# Patient Record
Sex: FEMALE | Race: BLACK OR AFRICAN AMERICAN | NOT HISPANIC OR LATINO | Employment: FULL TIME | URBAN - METROPOLITAN AREA
[De-identification: names, ages, dates, MRNs, and addresses within clinical notes are randomized per-mention and may not be internally consistent; named-entity substitution may affect disease eponyms.]

---

## 2022-06-29 ENCOUNTER — PRENATAL OFFICE VISIT (OUTPATIENT)
Dept: OBGYN | Facility: CLINIC | Age: 42
End: 2022-06-29
Payer: MEDICAID

## 2022-06-29 VITALS
BODY MASS INDEX: 35.7 KG/M2 | DIASTOLIC BLOOD PRESSURE: 73 MMHG | WEIGHT: 194 LBS | HEART RATE: 109 BPM | SYSTOLIC BLOOD PRESSURE: 115 MMHG | HEIGHT: 62 IN | OXYGEN SATURATION: 99 %

## 2022-06-29 DIAGNOSIS — O09.529 ANTEPARTUM MULTIGRAVIDA OF ADVANCED MATERNAL AGE: Primary | ICD-10-CM

## 2022-06-29 DIAGNOSIS — R89.9 ABNORMAL LABORATORY TEST: ICD-10-CM

## 2022-06-29 LAB
ABO/RH(D): NORMAL
ANTIBODY SCREEN: NEGATIVE
BASOPHILS # BLD AUTO: 0 10E3/UL (ref 0–0.2)
BASOPHILS NFR BLD AUTO: 1 %
EOSINOPHIL # BLD AUTO: 0.1 10E3/UL (ref 0–0.7)
EOSINOPHIL NFR BLD AUTO: 1 %
ERYTHROCYTE [DISTWIDTH] IN BLOOD BY AUTOMATED COUNT: 15.4 % (ref 10–15)
HBA1C MFR BLD: 6.4 % (ref 0–5.6)
HBV SURFACE AG SERPL QL IA: NONREACTIVE
HCT VFR BLD AUTO: 33 % (ref 35–47)
HCV AB SERPL QL IA: NONREACTIVE
HGB BLD-MCNC: 10.7 G/DL (ref 11.7–15.7)
HIV 1+2 AB+HIV1 P24 AG SERPL QL IA: NONREACTIVE
IMM GRANULOCYTES # BLD: 0 10E3/UL
IMM GRANULOCYTES NFR BLD: 1 %
LYMPHOCYTES # BLD AUTO: 1.3 10E3/UL (ref 0.8–5.3)
LYMPHOCYTES NFR BLD AUTO: 22 %
MCH RBC QN AUTO: 22.2 PG (ref 26.5–33)
MCHC RBC AUTO-ENTMCNC: 32.4 G/DL (ref 31.5–36.5)
MCV RBC AUTO: 69 FL (ref 78–100)
MONOCYTES # BLD AUTO: 0.4 10E3/UL (ref 0–1.3)
MONOCYTES NFR BLD AUTO: 8 %
NEUTROPHILS # BLD AUTO: 4 10E3/UL (ref 1.6–8.3)
NEUTROPHILS NFR BLD AUTO: 69 %
PLATELET # BLD AUTO: 267 10E3/UL (ref 150–450)
RBC # BLD AUTO: 4.82 10E6/UL (ref 3.8–5.2)
RUBV IGG SERPL QL IA: 14.8 INDEX
RUBV IGG SERPL QL IA: POSITIVE
SPECIMEN EXPIRATION DATE: NORMAL
T PALLIDUM AB SER QL: NONREACTIVE
WBC # BLD AUTO: 5.8 10E3/UL (ref 4–11)

## 2022-06-29 PROCEDURE — 87624 HPV HI-RISK TYP POOLED RSLT: CPT | Performed by: NURSE PRACTITIONER

## 2022-06-29 PROCEDURE — 86780 TREPONEMA PALLIDUM: CPT | Performed by: NURSE PRACTITIONER

## 2022-06-29 PROCEDURE — 86850 RBC ANTIBODY SCREEN: CPT | Performed by: NURSE PRACTITIONER

## 2022-06-29 PROCEDURE — 87591 N.GONORRHOEAE DNA AMP PROB: CPT | Performed by: NURSE PRACTITIONER

## 2022-06-29 PROCEDURE — 87086 URINE CULTURE/COLONY COUNT: CPT | Performed by: NURSE PRACTITIONER

## 2022-06-29 PROCEDURE — 87340 HEPATITIS B SURFACE AG IA: CPT | Performed by: NURSE PRACTITIONER

## 2022-06-29 PROCEDURE — 86803 HEPATITIS C AB TEST: CPT | Performed by: NURSE PRACTITIONER

## 2022-06-29 PROCEDURE — 85025 COMPLETE CBC W/AUTO DIFF WBC: CPT | Performed by: NURSE PRACTITIONER

## 2022-06-29 PROCEDURE — 86901 BLOOD TYPING SEROLOGIC RH(D): CPT | Performed by: NURSE PRACTITIONER

## 2022-06-29 PROCEDURE — G0145 SCR C/V CYTO,THINLAYER,RESCR: HCPCS | Performed by: NURSE PRACTITIONER

## 2022-06-29 PROCEDURE — 86900 BLOOD TYPING SEROLOGIC ABO: CPT | Performed by: NURSE PRACTITIONER

## 2022-06-29 PROCEDURE — 83036 HEMOGLOBIN GLYCOSYLATED A1C: CPT | Performed by: NURSE PRACTITIONER

## 2022-06-29 PROCEDURE — 87389 HIV-1 AG W/HIV-1&-2 AB AG IA: CPT | Performed by: NURSE PRACTITIONER

## 2022-06-29 PROCEDURE — 36415 COLL VENOUS BLD VENIPUNCTURE: CPT | Performed by: NURSE PRACTITIONER

## 2022-06-29 PROCEDURE — 99207 PR FIRST OB VISIT: CPT | Performed by: NURSE PRACTITIONER

## 2022-06-29 PROCEDURE — 87491 CHLMYD TRACH DNA AMP PROBE: CPT | Performed by: NURSE PRACTITIONER

## 2022-06-29 PROCEDURE — 86762 RUBELLA ANTIBODY: CPT | Performed by: NURSE PRACTITIONER

## 2022-06-29 ASSESSMENT — PAIN SCALES - GENERAL: PAINLEVEL: NO PAIN (0)

## 2022-06-29 NOTE — PATIENT INSTRUCTIONS
If you have any questions regarding your visit, Please contact your care team.     [facility]: [phone]  To Schedule an Appointment 24/7  Call: [phone] TELEPHONE [facility]BER     Jessica Hahn- APRN CNP      Rocky Mcneill-TRACIE Strickland-RN  Salud Gillespie-Surgery Scheduler  Mariel-Surgery Scheduler         Monday 7:30 am-5:00 pm    Tuesday 8:00 am-4:00 pm    Wednesday 7:30 am-4:00 pm  [place]    Thursday 8:00 am-11:00 am    Friday 7:30 am-4:00 pm [address] [place] [place] [address] [phone] ask for [facility]  [phone] Fax    Imaging Scheduling all locations  [phone]     [facility] Labor and Delivery  [address] [place] [address]   [place], [place] [id number] [phone]         Urgent Care locations:  [facility]   Monday-Friday  10 am - 8 pm  Saturday and Sunday   9 am - 5 pm     [phone]   If you need a medication refill, please contact your pharmacy. Please allow 3 business days for your refill to be completed.  As always, Thank you for trusting us with your healthcare needs!      see additional instructions from your care team below

## 2022-06-29 NOTE — PROGRESS NOTES
"Steve is a 41 year old  @ 12 weeks here for new OB visit.      See OB questionnaire for pertinent components of HPI.    OBhx:  x 3-per patient, no pregnancy or delivery complications  SAB x 1  Gyne: Pap smears Normal  Past Medical History:   Diagnosis Date     No pertinent past medical history      Past Surgical History:   Procedure Laterality Date     NO HISTORY OF SURGERY       There are no problems to display for this patient.     No Known Allergies  Current Outpatient Medications   Medication Sig Dispense Refill     Prenatal Vit-Fe Fumarate-FA (PRENATAL VITAMIN PO)          Review of Systems:     CONSTITUTIONAL:NEGATIVE for fever, chills, change in weight  INTEGUMENTARY/SKIN: NEGATIVE for worrisome rashes, moles or lesions  EYES: NEGATIVE for vision changes or irritation  ENT/MOUTH: NEGATIVE for ear, mouth and throat problems  RESP:NEGATIVE for significant cough or SOB  BREAST: NEGATIVE for masses, tenderness or discharge  CV: NEGATIVE for chest pain, palpitations or peripheral edema  GI: NEGATIVE for nausea, abdominal pain, heartburn, or change in bowel habits  :  Denies current vaginal bleeding, abnormal vaginal discharge  NEURO: NEGATIVE for weakness, dizziness or paresthesias  HEME/ALLERGY/IMMUNE: NEGATIVE for bleeding problems  PSYCHIATRIC: NEGATIVE for changes in mood or affect      Past Medical History of Father of Baby: No significant medical history  History Since Last Menstrual Period: No Problems    Physical Exam: /73 (BP Location: Right arm, Patient Position: Sitting, Cuff Size: Adult Regular)   Pulse 109   Ht 1.575 m (5' 2\")   Wt 88 kg (194 lb)   LMP 2022   SpO2 99%   BMI 35.48 kg/m    General: Well developed, well nourished female  Skin: Normal  Neck: Supple,no adenopathy,thyroid normal  Chest: Clear to auscultation  Heart: Regular rate, rhythm,No murmur, rub, gallop  Abdomen: Benign and No masses, organomegaly    Extremities: Normal  Neurological: Normal   Perineum: " Intact   Vulva: Normal  Vagina: Normal mucosa, no discharge  Cervix: Parous, closed    A/P 41 year old  at 12 weeks  Discussed physician coverage, tertiary support, diet, exercise, weight gain, schedule of visits, routine and indicated ultrasounds, and childbirth education.  Prenatal labs: Prenatal Panel, GC, Chlamydia, Urine Culture, Pap smear, A1C  Continue taking prenatal vitamins  Discussed genetic screening with MQS, CfDNA, first trimester screen-patient declines 1st trimester screening/genetic testing, but would be amenable to level 2 ultrasound with [facility].     Discussed aspirin use in pregnancy.  Low-dose aspirin prophylaxis can be beneficial in women at high risk of developing preeclampsia.  I generally recommend we begin aspirin at about 12-13 weeks gestation and continue until at least 36 weeks.     Women with at least one of the following conditions are considered high risk for developing preeclampsia: Previous pregnancy with preeclampsia,  multifetal-gestation, chronic hypertension, diabetes mellitus, chronic kidney disease, autoimmune disease.     Women with more than 1 of the following conditions may also consider low-dose aspirin prophylaxis in pregnancy: Nulliparity, BMI greater than 30, family history of preeclampsia (mother or sister), AMA, socio-demographic characteristics, personal risk factors.       Patient does meet the above criteria.  Discussed risks and benefits of low dose Asprin therapy and she is undecided, does not think she would desire to take this despite recommendations after discussion of benefits vs risks.     Jessica SHERWOOD CNP

## 2022-06-30 LAB
C TRACH DNA SPEC QL NAA+PROBE: NEGATIVE
N GONORRHOEA DNA SPEC QL NAA+PROBE: NEGATIVE

## 2022-07-01 PROBLEM — O99.019 ANTEPARTUM ANEMIA: Status: ACTIVE | Noted: 2022-07-01

## 2022-07-01 LAB
BACTERIA UR CULT: NO GROWTH
BKR LAB AP GYN ADEQUACY: NORMAL
BKR LAB AP GYN INTERPRETATION: NORMAL
BKR LAB AP HPV REFLEX: NORMAL
BKR LAB AP LMP: NORMAL
BKR LAB AP PREVIOUS ABNORMAL: NORMAL
PATH REPORT.COMMENTS IMP SPEC: NORMAL
PATH REPORT.COMMENTS IMP SPEC: NORMAL
PATH REPORT.RELEVANT HX SPEC: NORMAL

## 2022-07-05 ENCOUNTER — TELEPHONE (OUTPATIENT)
Dept: OBGYN | Facility: CLINIC | Age: 42
End: 2022-07-05

## 2022-07-05 LAB
HUMAN PAPILLOMA VIRUS 16 DNA: NEGATIVE
HUMAN PAPILLOMA VIRUS 18 DNA: NEGATIVE
HUMAN PAPILLOMA VIRUS FINAL DIAGNOSIS: NORMAL
HUMAN PAPILLOMA VIRUS OTHER HR: NEGATIVE

## 2022-07-05 NOTE — TELEPHONE ENCOUNTER
[facility] Call Center    Phone Message    May a detailed message be left on voicemail: yes     Reason for Call: Requesting Results   Name/type of test: Labs  Date of test: 7/01/2022  Was test done at a location other than [facility]       Action Taken: Message routed to:  [facility] p [phone]    Travel Screening: Not Applicable

## 2022-07-05 NOTE — TELEPHONE ENCOUNTER
See result note encounter, RN closing this encounter.    Charito Castellano RN on 7/5/2022 at 8:39 AM

## 2022-07-12 ENCOUNTER — ANCILLARY PROCEDURE (OUTPATIENT)
Dept: ULTRASOUND IMAGING | Facility: CLINIC | Age: 42
End: 2022-07-12
Attending: NURSE PRACTITIONER
Payer: MEDICAID

## 2022-07-12 DIAGNOSIS — O09.529 ANTEPARTUM MULTIGRAVIDA OF ADVANCED MATERNAL AGE: ICD-10-CM

## 2022-07-12 PROCEDURE — 76805 OB US >/= 14 WKS SNGL FETUS: CPT | Performed by: RADIOLOGY

## 2022-07-18 ENCOUNTER — LAB (OUTPATIENT)
Dept: LAB | Facility: CLINIC | Age: 42
End: 2022-07-18
Payer: MEDICAID

## 2022-07-18 DIAGNOSIS — R89.9 ABNORMAL LABORATORY TEST: ICD-10-CM

## 2022-07-18 LAB — GLUCOSE 1H P 50 G GLC PO SERPL-MCNC: 135 MG/DL (ref 70–129)

## 2022-07-18 PROCEDURE — 82950 GLUCOSE TEST: CPT

## 2022-07-18 PROCEDURE — 36415 COLL VENOUS BLD VENIPUNCTURE: CPT

## 2022-07-19 DIAGNOSIS — O09.522 MULTIGRAVIDA OF ADVANCED MATERNAL AGE IN SECOND TRIMESTER: Primary | ICD-10-CM

## 2022-07-21 ENCOUNTER — LAB (OUTPATIENT)
Dept: LAB | Facility: CLINIC | Age: 42
End: 2022-07-21
Payer: MEDICAID

## 2022-07-21 DIAGNOSIS — O09.522 MULTIGRAVIDA OF ADVANCED MATERNAL AGE IN SECOND TRIMESTER: ICD-10-CM

## 2022-07-21 LAB
GESTATIONAL GTT 1 HR POST DOSE: 185 MG/DL (ref 60–179)
GESTATIONAL GTT 2 HR POST DOSE: 165 MG/DL (ref 60–154)
GESTATIONAL GTT 3 HR POST DOSE: 74 MG/DL (ref 60–139)
GLUCOSE P FAST SERPL-MCNC: 101 MG/DL (ref 60–94)

## 2022-07-21 PROCEDURE — 36415 COLL VENOUS BLD VENIPUNCTURE: CPT

## 2022-07-21 PROCEDURE — 82951 GLUCOSE TOLERANCE TEST (GTT): CPT

## 2022-07-21 PROCEDURE — 82952 GTT-ADDED SAMPLES: CPT

## 2022-07-24 DIAGNOSIS — O24.410 DIET CONTROLLED GESTATIONAL DIABETES MELLITUS (GDM) IN SECOND TRIMESTER: ICD-10-CM

## 2022-07-24 DIAGNOSIS — O09.522 MULTIGRAVIDA OF ADVANCED MATERNAL AGE IN SECOND TRIMESTER: ICD-10-CM

## 2022-07-27 ENCOUNTER — PRENATAL OFFICE VISIT (OUTPATIENT)
Dept: OBGYN | Facility: CLINIC | Age: 42
End: 2022-07-27
Payer: MEDICAID

## 2022-07-27 VITALS
SYSTOLIC BLOOD PRESSURE: 101 MMHG | BODY MASS INDEX: 36.98 KG/M2 | DIASTOLIC BLOOD PRESSURE: 67 MMHG | HEART RATE: 93 BPM | WEIGHT: 202.19 LBS

## 2022-07-27 DIAGNOSIS — O24.410 DIET CONTROLLED GESTATIONAL DIABETES MELLITUS (GDM) IN SECOND TRIMESTER: ICD-10-CM

## 2022-07-27 DIAGNOSIS — O09.522 MULTIGRAVIDA OF ADVANCED MATERNAL AGE IN SECOND TRIMESTER: Primary | ICD-10-CM

## 2022-07-27 PROCEDURE — 99207 PR PRENATAL VISIT: CPT | Performed by: ADVANCED PRACTICE MIDWIFE

## 2022-07-27 RX ORDER — ASPIRIN 81 MG/1
81 TABLET, CHEWABLE ORAL DAILY
Qty: 90 TABLET | Refills: 2 | Status: SHIPPED | OUTPATIENT
Start: 2022-07-27 | End: 2022-10-25

## 2022-07-27 RX ORDER — VITAMIN A ACETATE, .BETA.-CAROTENE, ASCORBIC ACID, CHOLECALCIFEROL, .ALPHA.-TOCOPHEROL ACETATE, DL-, THIAMINE MONONITRATE, RIBOFLAVIN, NIACINAMIDE, PYRIDOXINE HYDROCHLORIDE, FOLIC ACID, CYANOCOBALAMIN, CALCIUM CARBONATE, FERROUS FUMARATE, ZINC OXIDE, AND CUPRIC OXIDE 2000; 2000; 120; 400; 22; 1.84; 3; 20; 10; 1; 12; 200; 27; 25; 2 [IU]/1; [IU]/1; MG/1; [IU]/1; MG/1; MG/1; MG/1; MG/1; MG/1; MG/1; UG/1; MG/1; MG/1; MG/1; MG/1
1 TABLET ORAL DAILY
Qty: 100 TABLET | Refills: 3 | Status: SHIPPED | OUTPATIENT
Start: 2022-07-27

## 2022-07-27 NOTE — PROGRESS NOTES
Complains of right shoulder/arn pain.  Recommend she see PCP.   Has what sounds like round ligament pain with turning in bed.   Discussed relief measures.   Declines quad.   MFM referral placed for AMA for comprehensive ultrasound.   Discussed weight.   She would prefer to see the same provider.   RTC 4 weeks.   KAELA Haddad, CNM

## 2022-07-29 ENCOUNTER — VIRTUAL VISIT (OUTPATIENT)
Dept: EDUCATION SERVICES | Facility: CLINIC | Age: 42
End: 2022-07-29
Payer: MEDICAID

## 2022-07-29 DIAGNOSIS — O24.410 DIET CONTROLLED GESTATIONAL DIABETES MELLITUS (GDM) IN SECOND TRIMESTER: Primary | ICD-10-CM

## 2022-07-29 PROCEDURE — G0108 DIAB MANAGE TRN  PER INDIV: HCPCS | Performed by: DIETITIAN, REGISTERED

## 2022-07-29 RX ORDER — LANCETS
EACH MISCELLANEOUS
Qty: 100 EACH | Refills: 4 | Status: SHIPPED | OUTPATIENT
Start: 2022-07-29 | End: 2023-01-09

## 2022-07-29 RX ORDER — BLOOD SUGAR DIAGNOSTIC
STRIP MISCELLANEOUS
Qty: 200 STRIP | Refills: 4 | Status: SHIPPED | OUTPATIENT
Start: 2022-07-29

## 2022-07-29 RX ORDER — BLOOD-GLUCOSE METER
1 EACH MISCELLANEOUS PRN
Qty: 1 KIT | Refills: 0 | Status: SHIPPED | OUTPATIENT
Start: 2022-07-29 | End: 2023-02-21

## 2022-07-29 NOTE — LETTER
7/29/2022         RE: Steve Pena  [address] [place] [address]        Dear Colleague,    Thank you for referring your patient, Steve Pena, to the [facility]. Please see a copy of my visit note below.    Type of Service: Telephone Visit    Originating Location (Patient Location): Home  Distant Location (Provider Location): [facility]  Mode of Communication:  Telephone    Telephone Visit Start Time: 10:04  Telephone Visit End Time (telephone visit stop time): 10:49    How would patient like to obtain AVS? MyChart     Diabetes Self-Management Education & Support      SUBJECTIVE/OBJECTIVE:  Presents for education related to gestational diabetes.    Accompanied by: Self  Diabetes management related comments/concerns: wondering about diabetes  Gestational weeks: 16w6d  [facility]  Number of previous pregnancies: 3  Had any babies over 9 lbs: No  Previously had Gestational Diabetes: No  Hypertension : No  High Cholesterol: No  High Triglycerides: No  Do you use tobacco products?: No  Do you drink beer, wine or hard liquor?: No    Cultural Influences/Ethnic Background:  Not  or     Estimated Date of Delivery: Jan 7, 2023    1 hour OGTT  Lab Results   Component Value Date    GLU1 135 (H) 07/18/2022         3 hour OGTT    Fasting  Lab Results   Component Value Date    GTTGF 101 (H) 07/21/2022       1 hour  Lab Results   Component Value Date    GTTG1 185 (H) 07/21/2022       2 hour  Lab Results   Component Value Date    GTTG2 165 (H) 07/21/2022       3 hour  Lab Results   Component Value Date    GTTG3 74 07/21/2022       Lifestyle and Health Behaviors:  Pre-pregnancy weight (lbs): 185  Exercise:: Currently not exercising (has not been exercising)  Barrier to exercise: None  Meals include: Breakfast, Lunch, Dinner  Breakfast: 6-615 (wakes)-water- eats at 9am,tea in morning  or bread OR bread with eggs OR oatmeal +  powdered milk  Lunch: 1-2pm: rice soup + grains/beans OR greens + fish/goat/beef OR canned beans with fish + white rice  Dinner: Watermelon + yogurt OR cereal with milk + vegetables  Snacks: fruit  Other: nauseated with pregnancy-sucking carlos candy, juice.  Beverages: Tea, Water, Juice  Pre- vitamin?: Yes  Supplements?: Yes  List supplements currently taking: Iron    Healthy Coping:  Emotional response to diabetes: Ready to learn  Informal Support system:: Family  Stage of change: PREPARATION (Decided to change - considering how)    Current Management:  Taking medications for gestational diabetes?: No    ASSESSMENT:  Steve has a new GDM diagnosis and also had A1C checked last month and was 6.4%. She is concerned about diabetes, we discussed this including increased risk of developing type 2 DMwith GDM diagnosis and A1C but we can reduce risk of development with diet/exercise. Will discuss more next week. We did GDM ed, she did well with education and verbalized understanding of all topics covered today. 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 Patient advised to call if 3 blood glucose elevated before returns next week. Instructed on ketone checking and told to call if unable to get ketones negative.       Discussed carbohydrate sources and impact on blood glucose. Reviewed basics of healthy eating and incorporating a variety of foods into meal plan. Instructed on carbohydrate counting and label reading and recommended patient consume 30-45g cho for breakfast, 45-60g cho for lunch and dinner and 15-30g cho for each snacks, also adding protein at each of these. Suggested plate method to balance meals.      Discussed importance of not going too low in carbohydrates since that may cause liver to produce excess glucose and  "contribute to elevated blood glucose readings and ketone formation. Encouraged eating breakfast within 1 hour of waking.  To also help prevent against ketone formation, protein was encouraged with meals and snacks, especially with the night snack. Reviewed benefits of exercising to help lower blood glucose and walking after meals, as tolerated and per MD approval, if seeing elevated blood glucose after a meal. Pt verbalized understanding of concepts discussed and recommendations provided.      Educational topics covered today:  GDM diagnosis, pathophysiology, Risks and Complications of GDM, Means of controlling GDM, Using a Blood Glucose Monitor, Blood Glucose Goals, Logging and Interpreting Glucose Results, Ketone Testing, When to Call a Diabetes Educator or OB Provider, Healthy Eating During Pregnancy, Counting Carbohydrates, Meal Planning for GDM, and Physical Activity    Educational materials provided today: (emailed)  Wilson Understanding Gestational Diabetes  GDM Log Book  Sharps Disposal  Care After Delivery      Pt verbalized understanding of concepts discussed and recommendations provided today.     PLAN:  Your 1 week follow-up Diabetes Education visit is scheduled on 8/5    1. Check glucose 4 times daily, before breakfast daily and 1 hour after each meal, or as recommended.  Blood glucose goal before breakfast: <95 mg/dL  1 hour after start of meals:  <140 mg/dL OR  2 hours after start of meal: <120mg/dL (can do this if you forget 1 hour check)     -Lancets should be placed in a sharps container or you can use a laundry container, do not throw lancets in the trash.  If using laundry container, once mostly full, can duct-tape the lid closed, label \"Sharps-do not recycle\" and then place in trash. You can call your garbage service to find appropriate drop off sites for lancets.    2. Check ketones daily or once a week after they have been negative for 7 days in a row. If ketones are elevated, let your " diabetes educator know and continue to check daily until they are negative for 7 days in a row.    3. Continue with recommended physical activity.    4. Continue to follow recommended meal plan: 30-45g carbs at breakfast, 45-60g carbs at lunch, 45-60g carbs at supper, 15-30g carbs at snacks.  Follow consistent CHO meal plan, eat CHO and protein/fat at all meals/snacks.    5. Follow-up with OB doctor as recommended.    6. Call or MyChart message your diabetes educator if 3 or more blood sugars are above the goal in 1 week or if ketones are elevated (trace or above).     Ashley Smalls RD, MIGUEL, [facility]      Time Spent: 45 minutes  Encounter Type: Individual    Any diabetes medication dose changes were made via the CDE Protocol and Collaborative Practice Agreement with the patient's OB/GYN provider. A copy of this encounter was shared with the provider.

## 2022-07-29 NOTE — PROGRESS NOTES
Type of Service: Telephone Visit    Originating Location (Patient Location): Home  Distant Location (Provider Location): [facility]  Mode of Communication:  Telephone    Telephone Visit Start Time: 10:04  Telephone Visit End Time (telephone visit stop time): 10:49    How would patient like to obtain AVS? Loren     Diabetes Self-Management Education & Support      SUBJECTIVE/OBJECTIVE:  Presents for education related to gestational diabetes.    Accompanied by: Self  Diabetes management related comments/concerns: wondering about diabetes  Gestational weeks: 16w6d  [facility]ospital planned [facility] delivery: [facility]  Number of previous pregnancies: 3  Had any babies over 9 lbs: No  Previously had Gestational Diabetes: No  Hypertension : No  High Cholesterol: No  High Triglycerides: No  Do you use tobacco products?: No  Do you drink beer, wine or hard liquor?: No    Cultural Influences/Ethnic Background:  Not  or     Estimated Date of Delivery: Jan 7, 2023    1 hour OGTT  Lab Results   Component Value Date    GLU1 135 (H) 07/18/2022         3 hour OGTT    Fasting  Lab Results   Component Value Date    GTTGF 101 (H) 07/21/2022       1 hour  Lab Results   Component Value Date    GTTG1 185 (H) 07/21/2022       2 hour  Lab Results   Component Value Date    GTTG2 165 (H) 07/21/2022       3 hour  Lab Results   Component Value Date    GTTG3 74 07/21/2022       Lifestyle and Health Behaviors:  Pre-pregnancy weight (lbs): 185  Exercise:: Currently not exercising (has not been exercising)  Barrier to exercise: None  Meals include: Breakfast, Lunch, Dinner  Breakfast: 6-615 (wakes)-water- eats at 9am,tea in morning  or bread OR bread with eggs OR oatmeal + powdered milk  Lunch: 1-2pm: rice soup + grains/beans OR greens + fish/goat/beef OR canned beans with fish + white rice  Dinner: Watermelon + yogurt OR cereal with milk + vegetables  Snacks: fruit  Other: nauseated with pregnancy-sucking carlos  candy, juice.  Beverages: Tea, Water, Juice  Pre-rao vitamin?: Yes  Supplements?: Yes  List supplements currently taking: Iron    Healthy Coping:  Emotional response to diabetes: Ready to learn  Informal Support system:: Family  Stage of change: PREPARATION (Decided to change - considering how)    Current Management:  Taking medications for gestational diabetes?: No    ASSESSMENT:  Steve has a new GDM diagnosis and also had A1C checked last month and was 6.4%. She is concerned about diabetes, we discussed this including increased risk of developing type 2 DMwith GDM diagnosis and A1C but we can reduce risk of development with diet/exercise. Will discuss more next week. We did GDM ed, she did well with education and verbalized understanding of all topics covered today. 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 Patient advised to call if 3 blood glucose elevated before returns next week. Instructed on ketone checking and told to call if unable to get ketones negative.       Discussed carbohydrate sources and impact on blood glucose. Reviewed basics of healthy eating and incorporating a variety of foods into meal plan. Instructed on carbohydrate counting and label reading and recommended patient consume 30-45g cho for breakfast, 45-60g cho for lunch and dinner and 15-30g cho for each snacks, also adding protein at each of these. Suggested plate method to balance meals.      Discussed importance of not going too low in carbohydrates since that may cause liver to produce excess glucose and contribute to elevated blood glucose readings and ketone formation. Encouraged eating breakfast within 1 hour of waking.  To also help prevent against ketone formation, protein was encouraged with meals and snacks, especially with the night  "snack. Reviewed benefits of exercising to help lower blood glucose and walking after meals, as tolerated and per MD approval, if seeing elevated blood glucose after a meal. Pt verbalized understanding of concepts discussed and recommendations provided.      Educational topics covered today:  GDM diagnosis, pathophysiology, Risks and Complications of GDM, Means of controlling GDM, Using a Blood Glucose Monitor, Blood Glucose Goals, Logging and Interpreting Glucose Results, Ketone Testing, When to Call a Diabetes Educator or OB Provider, Healthy Eating During Pregnancy, Counting Carbohydrates, Meal Planning for GDM, and Physical Activity    Educational materials provided today: (emailed)  Wilson Understanding Gestational Diabetes  GDM Log Book  Sharps Disposal  Care After Delivery      Pt verbalized understanding of concepts discussed and recommendations provided today.     PLAN:  Your 1 week follow-up Diabetes Education visit is scheduled on 8/5    1. Check glucose 4 times daily, before breakfast daily and 1 hour after each meal, or as recommended.  Blood glucose goal before breakfast: <95 mg/dL  1 hour after start of meals:  <140 mg/dL OR  2 hours after start of meal: <120mg/dL (can do this if you forget 1 hour check)     -Lancets should be placed in a sharps container or you can use a laundry container, do not throw lancets in the trash.  If using laundry container, once mostly full, can duct-tape the lid closed, label \"Sharps-do not recycle\" and then place in trash. You can call your garbage service to find appropriate drop off sites for lancets.    2. Check ketones daily or once a week after they have been negative for 7 days in a row. If ketones are elevated, let your diabetes educator know and continue to check daily until they are negative for 7 days in a row.    3. Continue with recommended physical activity.    4. Continue to follow recommended meal plan: 30-45g carbs at breakfast, 45-60g carbs at lunch, " 45-60g carbs at supper, 15-30g carbs at snacks.  Follow consistent CHO meal plan, eat CHO and protein/fat at all meals/snacks.    5. Follow-up with OB doctor as recommended.    6. Call or MyChart message your diabetes educator if 3 or more blood sugars are above the goal in 1 week or if ketones are elevated (trace or above).     Ashley Smalls RD, MIGUEL, [facility]      Time Spent: 45 minutes  Encounter Type: Individual    Any diabetes medication dose changes were made via the CDE Protocol and Collaborative Practice Agreement with the patient's OB/GYN provider. A copy of this encounter was shared with the provider.

## 2022-07-29 NOTE — PATIENT INSTRUCTIONS
"Your 1 week follow-up Diabetes Education visit is scheduled on 8/5    1. Check glucose 4 times daily, before breakfast daily and 1 hour after each meal, or as recommended.  Blood glucose goal before breakfast: <95 mg/dL  1 hour after start of meals:  <140 mg/dL OR  2 hours after start of meal: <120mg/dL (can do this if you forget 1 hour check)     -Lancets should be placed in a sharps container or you can use a laundry container, do not throw lancets in the trash.  If using laundry container, once mostly full, can duct-tape the lid closed, label \"Sharps-do not recycle\" and then place in trash. You can call your [place] Education service to find appropriate drop off sites for lancets.    2. Check ketones daily or once a week after they have been negative for 7 days in a row. If ketones are elevated, let your diabetes educator know and continue to check daily until they are negative for 7 days in a row.    3. Continue with recommended physical activity.    4. Continue to follow recommended meal plan: 30-45g carbs at breakfast, 45-60g carbs at lunch, 45-60g carbs at supper, 15-30g carbs at snacks.  Follow consistent CHO meal plan, eat CHO and protein/fat at all meals/snacks.    5. Follow-up with OB doctor as recommended.    6. Call or MyChart message your diabetes educator if 3 or more blood sugars are above the goal in 1 week or if ketones are elevated (trace or above).       [place] [facility] for the [facility] [[email or web address]] Area:  For Your Diabetes Education or Nutrition Appointments Call:  [phone]   For Diabetes Education and Nutrition Related Questions:   Phone: [phone]  Send MyChart Message   If you need a medication refill please contact your pharmacy. Please allow 3 business days for your refills to be completed.     "

## 2022-08-01 ENCOUNTER — MYC MEDICAL ADVICE (OUTPATIENT)
Dept: EDUCATION SERVICES | Facility: CLINIC | Age: 42
End: 2022-08-01

## 2022-08-01 NOTE — CONFIDENTIAL NOTE
I called Steve. She reports that she started testing BG's on Saturday. Her fasting BG's have always been over 100mg/dL (like 106 and 114). She has a trace amount of ketones. She is usually not eating a bedtime snack, but once had some yogurt. We reviewed the importance of a bedtime snack and some options with carbs and protein. She has already tried some changes herself and noticed that today after lunch her BG was 138 when she didn't eat rice and had a bean stew instead.     Reviewed that she is doing a great job figuring out what works and what doesn't work for her blood sugars. I asked her to reach out if any questions before her appt on Friday with Ashley Kelly RN, [facility]

## 2022-08-05 ENCOUNTER — MYC MEDICAL ADVICE (OUTPATIENT)
Dept: OBGYN | Facility: CLINIC | Age: 42
End: 2022-08-05

## 2022-08-05 ENCOUNTER — TELEPHONE (OUTPATIENT)
Dept: EDUCATION SERVICES | Facility: CLINIC | Age: 42
End: 2022-08-05

## 2022-08-05 ENCOUNTER — VIRTUAL VISIT (OUTPATIENT)
Dept: EDUCATION SERVICES | Facility: CLINIC | Age: 42
End: 2022-08-05
Attending: ADVANCED PRACTICE MIDWIFE
Payer: COMMERCIAL

## 2022-08-05 DIAGNOSIS — O24.414 INSULIN CONTROLLED GESTATIONAL DIABETES MELLITUS (GDM) IN SECOND TRIMESTER: Primary | ICD-10-CM

## 2022-08-05 DIAGNOSIS — O24.410 DIET CONTROLLED GESTATIONAL DIABETES MELLITUS (GDM) IN SECOND TRIMESTER: Primary | ICD-10-CM

## 2022-08-05 PROCEDURE — G0108 DIAB MANAGE TRN  PER INDIV: HCPCS | Mod: AE | Performed by: DIETITIAN, REGISTERED

## 2022-08-05 NOTE — LETTER
8/5/2022         RE: Steve Pena  [address] [place] [address]        Dear Colleague,    Thank you for referring your patient, Steve Pena, to the [facility]. Please see a copy of my visit note below.    Type of Service: Telephone Visit    Originating Location (Patient Location): Home  Distant Location (Provider Location): Home  Mode of Communication:  Telephone    Telephone Visit Start Time: 8:15-later call because pt was driving when I first called  Telephone Visit End Time (telephone visit stop time): 9:00    How would patient like to obtain AVS? MyChart     Diabetes Self-Management Education & Support    SUBJECTIVE/OBJECTIVE:  Presents for education related to gestational diabetes.    Accompanied by: Self  Diabetes management related comments/concerns: wondering why BG are elevated  Gestational weeks: 17w6d  Number of previous pregnancies: 3  Had any babies over 9 lbs: No  Previously had Gestational Diabetes: No  Hypertension : No  High Cholesterol: No  High Triglycerides: No  Do you use tobacco products?: No  Do you drink beer, wine or hard liquor?: No    Cultural Influences/Ethnic Background:  Not  or       LMP 04/02/2022       Estimated Date of Delivery: Jan 7, 2023    Blood Glucose/Ketone Log:    Date Ketones Fasting Post Breakfast Post Lunch Post Supper   7/30 Negative 106 140 163 128   7/31 Negative  114 184 174 148   8/1 Negative 117 167 138 148   8/2 trace  121 180 152 124   8/3 Negative 119 153 130 131   8/4 Negative 126 176 174 170   8/5 Negative 119 156       Breakfast-bulgur wheat instead rice.  8/3 lunch/dinner- did bulgur wheat for lunch and dinner    Lifestyle and Health Behaviors:  Pre-pregnancy weight (lbs): 185  Exercise:: Currently not exercising (has not been exercising)  Barrier to exercise: None  Meals include: Breakfast, Lunch, Dinner  Breakfast: 6-615 (wakes)-water- eats at 9am,tea in morning  or bread OR bread with eggs OR oatmeal +  powdered milk  Lunch: 1-2pm: rice soup + grains/beans OR greens + fish/goat/beef OR canned beans with fish + bulgur or rice  Dinner: Watermelon + yogurt OR cereal with milk + vegetables  Snacks: fruit  Other: Stopped eating rice because BG were higher, started eating Bulgur  Beverages: Tea, Water  Pre-rao vitamin?: Yes  Supplements?: Yes  List supplements currently taking: Iron    Healthy Coping:  Emotional response to diabetes: Ready to learn  Informal Support system:: Family  Stage of change: ACTION (Actively working towards change)    Current Management:  Taking medications for gestational diabetes?: No  Difficulty affording diabetes medication?: No    ASSESSMENT:  Ketones: all negative except 1 trace  Fasting blood glucoses: 0% in target.  After breakfast: 0% in target.  After lunch: 50% in target.  After dinner: 50% in target.    Majority of Steve's BG are above goals. She has tried adjusting her diet by switching from rice to Bulgur, this has brought down numbers some but still above goal. She is following GDM diet cho/protein with meals. She tried a HS snack to bring down fasting BG and she reports numbers were higher. We discussed importance of getting adequate nutrition for her and baby. Discussed that she just needs some insulin to help bring down the numbers. Ordered Levemir to start.     INTERVENTION:  Educational topics covered today:  What to expect after delivery, Future testing for Type 2 diabetes (2 hour OGTT at 6 week post-partum check-up and annual fasting blood glucose level), Risk of GDM and planning ahead for future pregnancies, Recommended lifestyle interventions for reducing the risk of Type 2 Diabetes, When to Call a Diabetes Educator or OB Provider    Educational Materials provided today:  None    PLAN:  1. Check glucose 4 times daily, before breakfast daily and 1 hour after each meal, or as recommended.  Blood glucose goal before breakfast: <95 mg/dL  1 hour after start of meals:  <140  mg/dL OR  2 hours after start of meal: <120mg/dL (can do this if you forget 1 hour check)      2. Check ketones once a week     3. Continue with recommended physical activity.    4. Continue to follow recommended meal plan: 30-45g carbs at breakfast, 45-60g carbs at lunch, 45-60g carbs at supper, 15-30g carbs at snacks.  Follow consistent CHO meal plan, eat CHO and protein/fat at all meals/snacks.    5. Follow-up with OB doctor as recommended.    6. Start Levemir 18units, increase by 2u every 2 days if fasting blood sugars are > 95mg/dL      Time Spent: 45 minutes  Encounter Type: Individual    Any diabetes medication dose changes were made via the CDE Protocol and Collaborative Practice Agreement with the patient's OB/GYN provider. A copy of this encounter was shared with the provider.

## 2022-08-05 NOTE — PROGRESS NOTES
Type of Service: Telephone Visit    Originating Location (Patient Location): Home  Distant Location (Provider Location): Home  Mode of Communication:  Telephone    Telephone Visit Start Time: 8:15-later call because pt was driving when I first called  Telephone Visit End Time (telephone visit stop time): 9:00    How would patient like to obtain AVS? Loren     Diabetes Self-Management Education & Support    SUBJECTIVE/OBJECTIVE:  Presents for education related to gestational diabetes.    Accompanied by: Self  Diabetes management related comments/concerns: wondering why BG are elevated  Gestational weeks: 17w6d  Number of previous pregnancies: 3  Had any babies over 9 lbs: No  Previously had Gestational Diabetes: No  Hypertension : No  High Cholesterol: No  High Triglycerides: No  Do you use tobacco products?: No  Do you drink beer, wine or hard liquor?: No    Cultural Influences/Ethnic Background:  Not  or       LMP 04/02/2022       Estimated Date of Delivery: Jan 7, 2023    Blood Glucose/Ketone Log:    Date Ketones Fasting Post Breakfast Post Lunch Post Supper   7/30 Negative 106 140 163 128   7/31 Negative  114 184 174 148   8/1 Negative 117 167 138 148   8/2 trace  121 180 152 124   8/3 Negative 119 153 130 131   8/4 Negative 126 176 174 170   8/5 Negative 119 156       Breakfast-bulgur wheat instead rice.  8/3 lunch/dinner- did bulgur wheat for lunch and dinner    Lifestyle and Health Behaviors:  Pre-pregnancy weight (lbs): 185  Exercise:: Currently not exercising (has not been exercising)  Barrier to exercise: None  Meals include: Breakfast, Lunch, Dinner  Breakfast: 6-615 (wakes)-water- eats at 9am,tea in morning  or bread OR bread with eggs OR oatmeal + powdered milk  Lunch: 1-2pm: rice soup + grains/beans OR greens + fish/goat/beef OR canned beans with fish + bulgur or rice  Dinner: Watermelon + yogurt OR cereal with milk + vegetables  Snacks: fruit  Other: Stopped eating rice because BG were  higher, started eating Bulgur  Beverages: Tea, Water  Pre-rao vitamin?: Yes  Supplements?: Yes  List supplements currently taking: Iron    Healthy Coping:  Emotional response to diabetes: Ready to learn  Informal Support system:: Family  Stage of change: ACTION (Actively working towards change)    Current Management:  Taking medications for gestational diabetes?: No  Difficulty affording diabetes medication?: No    ASSESSMENT:  Ketones: all negative except 1 trace  Fasting blood glucoses: 0% in target.  After breakfast: 0% in target.  After lunch: 50% in target.  After dinner: 50% in target.    Majority of Steve's BG are above goals. She has tried adjusting her diet by switching from rice to Bulgur, this has brought down numbers some but still above goal. She is following GDM diet cho/protein with meals. She tried a HS snack to bring down fasting BG and she reports numbers were higher. We discussed importance of getting adequate nutrition for her and baby. Discussed that she just needs some insulin to help bring down the numbers. Ordered Levemir to start.     INTERVENTION:  Educational topics covered today:  What to expect after delivery, Future testing for Type 2 diabetes (2 hour OGTT at 6 week post-partum check-up and annual fasting blood glucose level), Risk of GDM and planning ahead for future pregnancies, Recommended lifestyle interventions for reducing the risk of Type 2 Diabetes, When to Call a Diabetes Educator or OB Provider    Educational Materials provided today:  None    PLAN:  1. Check glucose 4 times daily, before breakfast daily and 1 hour after each meal, or as recommended.  Blood glucose goal before breakfast: <95 mg/dL  1 hour after start of meals:  <140 mg/dL OR  2 hours after start of meal: <120mg/dL (can do this if you forget 1 hour check)      2. Check ketones once a week     3. Continue with recommended physical activity.    4. Continue to follow recommended meal plan: 30-45g carbs at  breakfast, 45-60g carbs at lunch, 45-60g carbs at supper, 15-30g carbs at snacks.  Follow consistent CHO meal plan, eat CHO and protein/fat at all meals/snacks.    5. Follow-up with OB doctor as recommended.    6. Start Levemir 18units, increase by 2u every 2 days if fasting blood sugars are > 95mg/dL      Time Spent: 45 minutes  Encounter Type: Individual    Any diabetes medication dose changes were made via the CDE Protocol and Collaborative Practice Agreement with the patient's OB/GYN provider. A copy of this encounter was shared with the provider.

## 2022-08-05 NOTE — TELEPHONE ENCOUNTER
Met with pt for second visit. Majority of BG elevated. Ordered Levemir to start given majority of post-meal BG also elevated.     Thanks!    Ashley Smalls RD, LD, [facility]    Addendum-order sent to wrong provider. Closing encounter and opening new one.

## 2022-08-05 NOTE — PATIENT INSTRUCTIONS
Plan to share your glucose and ketone information with diabetes education once a week, unless otherwise directed.  Call us to schedule a follow-up for next week [phone] or you can send us a [place] Message or call triage line (see below)    1. Check glucose 4 times daily, before breakfast daily and 1 hour after each meal, or as recommended.  Blood glucose goal before breakfast: <95 mg/dL  1 hour after start of meals:  <140 mg/dL OR  2 hours after start of meal: <120mg/dL (can do this if you forget 1 hour check)      2. Check ketones daily or once a week after they have been negative for 7 days in a row. If ketones are elevated, let your diabetes educator know and continue to check daily until they are negative for 7 days in a row.    3. Continue with recommended physical activity.    4. Continue to follow recommended meal plan: 30-45g carbs at breakfast, 45-60g carbs at lunch, 45-60g carbs at supper, 15-30g carbs at snacks.  Follow consistent CHO meal plan, eat CHO and protein/fat at all meals/snacks.    5. Follow-up with OB doctor as recommended.    6. Start Levemir 18units, increase by 2u every 2 days if fasting blood sugars are > 95mg/dL    After Delivery:    Check blood sugar 4 - 6 times per week to be sure that the numbers have gone back to normal after baby is born. Check blood sugar before breakfast or 2 hours after the start of a meal.     Blood sugar goals are different when you are not pregnant:    Before breakfast: Less than 100  2 hours after a meal: Less than 140    If you have elevated numbers, contact your OB/GYN or primary care provider.    2. Have a 2-hour glucose tolerance test done at your post-partum check-up.    3. Continue with healthy eating and physical activity to get back to your pre-pregnancy weight.     4.  Have your fasting blood sugar checked once a year.    5. Plan ahead for future pregnancies - eat healthy, keep active, work with your doctor to check for gestational diabetes  early on in the pregnancy and check blood sugars as recommended by your doctor.     [place] [facility] for the [facility]:  For Your Diabetes Education or Nutrition Appointments Call:  [phone]   For Diabetes Education and Nutrition Related Questions:   Phone: [phone]  Send [employer] Message   If you need a medication refill please contact your pharmacy. Please allow 3 business days for your refills to be completed.

## 2022-08-05 NOTE — TELEPHONE ENCOUNTER
Met with pt for second visit. Majority of BG elevated. Ordered Levemir to start given majority of post-meal BG also elevated.     Thanks!    Ashley Smalls RD, LD, [facility]

## 2022-08-05 NOTE — TELEPHONE ENCOUNTER
RN routing to covering provider pool to sign off on medication in Jonna's absence prior to the weekend.    Charito Castellano RN on 8/5/2022 at 3:32 PM

## 2022-08-08 DIAGNOSIS — O24.414 INSULIN CONTROLLED GESTATIONAL DIABETES MELLITUS (GDM) IN SECOND TRIMESTER: ICD-10-CM

## 2022-08-08 NOTE — TELEPHONE ENCOUNTER
Routed to OB/GYN    Call from mar  Pharm needs max number of units on the levemir that she can go up to, also needs BD pen needles    Pharm cued    Silvana Gary RN   [facility]

## 2022-08-08 NOTE — TELEPHONE ENCOUNTER
I updated the directions on the insulin Rx to match our levemir starting guidelines (8 units, not 18 units as was listed on first prescription) and include a max TDD. Pt is aware and reports that 8 units was discussed with her during the appt. I also sent in a prescription for pen needles.    Sariah Kelly RN, [facility]

## 2022-08-08 NOTE — CONFIDENTIAL NOTE
I reviewed chart notes in order to respond to patient. I noted that the visit instructions said to start levemir at 18 units (which would be 0.2 units/kg/dayt, the [facility] starting dose recommendation). Pt notes that 8 units was discussed as the starting dose. 8 units of levemir is the typical starting dose for [facility] clinics. It appears pt clinic was marked as [facility] in error, but then corrected.     Since pt is going to start levemir I will [facility]mir start [facility] for gestational diabetes as it is the smaller of the 2 doses. I updated pt and providers.    Sariah Kelly RN, [facility]

## 2022-08-15 NOTE — TELEPHONE ENCOUNTER
[facility] referral faxed on 7/27.  Pt has not heard from them yet to schedule.  Cierra RN, called [facility] on Friday, 8/8, to make sure they received the referral.  They told her that they would call pt on 8/8.  Pt wrote in again on Friday, 8/12, stating that they have still not called her.  She has called them and left messages and they are still not calling her.

## 2022-08-18 NOTE — TELEPHONE ENCOUNTER
Pt states she still has not heard from [facility] and she has been LVM as well.  I called  [facility] and spoke with Maida, she states they do have the pt's referral and will bring it to the schedulers now to call pt to schedule.    Em Soto RN

## 2022-08-19 ENCOUNTER — VIRTUAL VISIT (OUTPATIENT)
Dept: EDUCATION SERVICES | Facility: CLINIC | Age: 42
End: 2022-08-19
Payer: COMMERCIAL

## 2022-08-19 DIAGNOSIS — O24.414 INSULIN CONTROLLED GESTATIONAL DIABETES MELLITUS (GDM) IN SECOND TRIMESTER: Primary | ICD-10-CM

## 2022-08-19 PROCEDURE — G0108 DIAB MANAGE TRN  PER INDIV: HCPCS | Mod: AE

## 2022-08-19 NOTE — LETTER
8/19/2022         RE: Steve Pena  [address] [address]        Dear Colleague,    Thank you for referring your patient, Steve Pena, to the [facility] DIABETES EDUCATION [place]. Please see a copy of my visit note below.    Diabetes Self-Management Education & Support  Video visit: 40 minutes    SUBJECTIVE/OBJECTIVE:  Presents for education related to gestational diabetes.    Accompanied by: Self  Diabetes management related comments/concerns: high BG even with insulin- mostly in the morning and following breakfast  Gestational weeks: 19 weeks 6 days  [place] [facility]  Next OB Visit Date: 08/24/22  Number of previous pregnancies: 3  Had any babies over 9 lbs: No  Previously had Gestational Diabetes: No  Hypertension : No  High Cholesterol: No  High Triglycerides: No  Do you use tobacco products?: No  Do you drink beer, wine or hard liquor?: No    Cultural Influences/Ethnic Background:  Not  or       LMP 04/02/2022     Weight reported per pt: 205 lbs    Estimated Date of Delivery: Jan 7, 2023    Blood Glucose/Ketone Log:   Date  Ketones FBG 1 hours post Breakfast 1 hour post lunch    1 hours post dinner   8/17  115 171     8/16   119 164 144 157   8/15  129 172 124 160   8/14  116 194 128 170   8/13  126 168 136 125       Date  Ketones FBG 1 hours post Breakfast 1 hour post lunch    1 hours post dinner   8/14  124 171 139 148   8/13  115 152 157 137   8/12  117 174 158 129   8/11  124 184 101 137   8/10   124 174 136 119   8/9  117 154 130 145   8/8  125 184 147 146   8/7  129 177 140 141       Diabetes Medication(s)     Insulin       insulin detemir (LEVEMIR PEN) 100 UNIT/ML pen    Start with 8 units at bedtime, if fasting BG great than 95 x 2 days increase dose by 2 units every 2 days until fasting BG below 95. Max TDD 40 units.      Is up to 14 units at bedtime.    Lifestyle and Health Behaviors:  Pre-pregnancy weight (lbs): 185  Exercise:: Yes (has  not been exercising)  Days per week of moderate to strenuous exercise (like a brisk walk): 4  On average, minutes per day of exercise at this level: 30  How intense was your typical exercise? : Light (like stretching or slow walking)  Exercise Minutes per Week: 120  Barrier to exercise: None  Meals include: Breakfast, Lunch, Dinner  Breakfast: 6-615 (wakes)-instant oatmeal 13 grams CHO plain or 1/2 C oatmeal 37 grams CHO or Belvita 35 grams CHO OR 2 eggs or toast whole grain  Lunch: 1-2pm: rice soup + grains/beans OR greens + fish/goat/beef OR canned beans with fish + bulgur or rice  Dinner: Watermelon + yogurt OR cereal with milk + vegetables OR PB sandwich  Snacks: fruit  Other: Stopped eating rice because BG were higher, started eating Bulgur  Beverages: Tea, Water  Pre-rao vitamin?: Yes  Supplements?: Yes  List supplements currently taking: Iron    Healthy Coping:  Emotional response to diabetes: Ready to learn  Informal Support system:: Family  Stage of change: ACTION (Actively working towards change)    Current Management:  Taking medications for gestational diabetes?: No  Difficulty affording diabetes medication?: No    ASSESSMENT:  Ketones: negative.   Fasting blood glucoses: 0% in target.  After breakfast: 0% in target.  After lunch: 67% in target.  After dinner: 42% in target.    Steve has been following carbohydrate meal recommendations.  She showed me her typical servings and breakfast items she is eating.  Uses a measuring cup and is reading labels.  Has been avoiding fried food. Eats fruits and vegetables for snacks between meals. Will pair fruit with peanuts for snack.  Is only drinking water or tea.  Has started walking after breakfast and dinner for 30 minutes or so.    INTERVENTION:  Educational topics covered today:  Insulin, carbohydrate counting    Educational Materials provided today:  [place] Preventing Diabetes    PLAN:  1) Go up to 18 units of insulin tonight before bed.  Then you my  increase by 2 units every 2-3 days until we meet again next week.  2) Check glucose 4 times daily.  3) Check ketones once a week when readings are consistently negative.  4) Continue with recommended physical activity.  Walking is great!  5) Continue to follow recommended meal plan: 2-3 carbs (30-45 grams) at breakfast, 3-4 carbs (45-60 grams) at lunch, 3-4 carbs (45-60 grams) at supper, 1-2 carbs (15-30 grams) at snacks.  Follow consistent CHO meal plan, eat CHO and protein/fat at all meals/snacks.    Call/e-mail/MyChart message diabetes educator if 3 or more blood sugars are above the goal in 1 week or if ketones are positive.    Jenny Lizama RDN, LD  Outpatient Diabetes Education  Adult Diabetes Education Triage [phone]    Time Spent: 40 minutes  Encounter Type: Individual    Any diabetes medication dose changes were made via the CDE Protocol and Collaborative Practice Agreement with the patient's referring provider and OB/GYN provider. A copy of this encounter was shared with the provider.

## 2022-08-19 NOTE — PROGRESS NOTES
Diabetes Self-Management Education & Support  Video visit: 40 minutes    SUBJECTIVE/OBJECTIVE:  Presents for education related to gestational diabetes.    Accompanied by: Self  Diabetes management related comments/concerns: high BG even with insulin- mostly in the morning and following breakfast  Gestational weeks: 19 weeks 6 days  Hospital planned for delivery: [facility]  Next OB Visit Date: 08/24/22  Number of previous pregnancies: 3  Had any babies over 9 lbs: No  Previously had Gestational Diabetes: No  Hypertension : No  High Cholesterol: No  High Triglycerides: No  Do you use tobacco products?: No  Do you drink beer, wine or hard liquor?: No    Cultural Influences/Ethnic Background:  Not  or       LMP 04/02/2022     Weight reported per pt: 205 lbs    Estimated Date of Delivery: Jan 7, 2023    Blood Glucose/Ketone Log:   Date  Ketones FBG 1 hours post Breakfast 1 hour post lunch    1 hours post dinner   8/17  115 171     8/16   119 164 144 157   8/15  129 172 124 160   8/14  116 194 128 170   8/13  126 168 136 125       Date  Ketones FBG 1 hours post Breakfast 1 hour post lunch    1 hours post dinner   8/14  124 171 139 148   8/13  115 152 157 137   8/12  117 174 158 129   8/11  124 184 101 137   8/10   124 174 136 119   8/9  117 154 130 145   8/8  125 184 147 146   8/7  129 177 140 141       Diabetes Medication(s)     Insulin       insulin detemir (LEVEMIR PEN) 100 UNIT/ML pen    Start with 8 units at bedtime, if fasting BG great than 95 x 2 days increase dose by 2 units every 2 days until fasting BG below 95. Max TDD 40 units.      Is up to 14 units at bedtime.    Lifestyle and Health Behaviors:  Pre-pregnancy weight (lbs): 185  Exercise:: Yes (has not been exercising)  Days per week of moderate to strenuous exercise (like a brisk walk): 4  On average, minutes per day of exercise at this level: 30  How intense was your typical exercise? : Light (like stretching or slow walking)  Exercise Minutes per  Order placed.   DM   Week: 120  Barrier to exercise: None  Meals include: Breakfast, Lunch, Dinner  Breakfast: 6-615 (wakes)-instant oatmeal 13 grams CHO plain or 1/2 C oatmeal 37 grams CHO or Belvita 35 grams CHO OR 2 eggs or toast whole grain  Lunch: 1-2pm: rice soup + grains/beans OR greens + fish/goat/beef OR canned beans with fish + bulgur or rice  Dinner: Watermelon + yogurt OR cereal with milk + vegetables OR PB sandwich  Snacks: fruit  Other: Stopped eating rice because BG were higher, started eating Bulgur  Beverages: Tea, Water  Pre-rao vitamin?: Yes  Supplements?: Yes  List supplements currently taking: Iron    Healthy Coping:  Emotional response to diabetes: Ready to learn  Informal Support system:: Family  Stage of change: ACTION (Actively working towards change)    Current Management:  Taking medications for gestational diabetes?: No  Difficulty affording diabetes medication?: No    ASSESSMENT:  Ketones: negative.   Fasting blood glucoses: 0% in target.  After breakfast: 0% in target.  After lunch: 67% in target.  After dinner: 42% in target.    Steve has been following carbohydrate meal recommendations.  She showed me her typical servings and breakfast items she is eating.  Uses a measuring cup and is reading labels.  Has been avoiding fried food. Eats fruits and vegetables for snacks between meals. Will pair fruit with peanuts for snack.  Is only drinking water or tea.  Has started walking after breakfast and dinner for 30 minutes or so.    INTERVENTION:  Educational topics covered today:  Insulin, carbohydrate counting    Educational Materials provided today:  [facility] Preventing Diabetes    PLAN:  1) Go up to 18 units of insulin tonight before bed.  Then you my increase by 2 units every 2-3 days until we meet again next week.  2) Check glucose 4 times daily.  3) Check ketones once a week when readings are consistently negative.  4) Continue with recommended physical activity.  Walking is great!  5) Continue to follow  recommended meal plan: 2-3 carbs (30-45 grams) at breakfast, 3-4 carbs (45-60 grams) at lunch, 3-4 carbs (45-60 grams) at supper, 1-2 carbs (15-30 grams) at snacks.  Follow consistent CHO meal plan, eat CHO and protein/fat at all meals/snacks.    Call/e-mail/MyChart message diabetes educator if 3 or more blood sugars are above the goal in 1 week or if ketones are positive.    Jenny Lizama RDN, LD  Outpatient Diabetes Education  Adult Diabetes Education Triage [phone]    Time Spent: 40 minutes  Encounter Type: Individual    Any diabetes medication dose changes were made via the CDE Protocol and Collaborative Practice Agreement with the patient's referring provider and OB/GYN provider. A copy of this encounter was shared with the provider.

## 2022-08-19 NOTE — PATIENT INSTRUCTIONS
1) Go up to 18 units of insulin tonight before bed.  Then you my increase by 2 units every 2-3 days until we meet again next week.  2) Check glucose 4 times daily.  3) Check ketones once a week when readings are consistently negative.  4) Continue with recommended physical activity.  Walking is great!  5) Continue to follow recommended meal plan: 2-3 carbs (30-45 grams) at breakfast, 3-4 carbs (45-60 grams) at lunch, 3-4 carbs (45-60 grams) at supper, 1-2 carbs (15-30 grams) at snacks.  Follow consistent CHO meal plan, eat CHO and protein/fat at all meals/snacks.    Call/e-mail/MyChart message diabetes educator if 3 or more blood sugars are above the goal in 1 week or if ketones are positive.

## 2022-08-23 ENCOUNTER — MEDICAL CORRESPONDENCE (OUTPATIENT)
Dept: HEALTH INFORMATION MANAGEMENT | Facility: CLINIC | Age: 42
End: 2022-08-23

## 2022-08-25 ENCOUNTER — PRENATAL OFFICE VISIT (OUTPATIENT)
Dept: OBGYN | Facility: CLINIC | Age: 42
End: 2022-08-25
Payer: COMMERCIAL

## 2022-08-25 VITALS
WEIGHT: 209 LBS | SYSTOLIC BLOOD PRESSURE: 106 MMHG | DIASTOLIC BLOOD PRESSURE: 65 MMHG | HEART RATE: 95 BPM | BODY MASS INDEX: 38.23 KG/M2 | OXYGEN SATURATION: 100 %

## 2022-08-25 DIAGNOSIS — O24.414 INSULIN CONTROLLED GESTATIONAL DIABETES MELLITUS (GDM) DURING PREGNANCY, ANTEPARTUM: ICD-10-CM

## 2022-08-25 DIAGNOSIS — O09.522 MULTIGRAVIDA OF ADVANCED MATERNAL AGE IN SECOND TRIMESTER: Primary | ICD-10-CM

## 2022-08-25 PROCEDURE — 99207 PR PRENATAL VISIT: CPT | Performed by: OBSTETRICS & GYNECOLOGY

## 2022-08-25 NOTE — PATIENT INSTRUCTIONS
If you have any questions regarding your visit, Please contact your care team.     [employer]: [phone]    To Schedule an Appointment 24/7  Call: [phone]     Carlos aJy MD  Medical Director    Leonardo Strickland-TRACIE Gillespie-Surgery Scheduler  Mariel-Surgery Scheduler               Tuesday-[place]  7:30 a.m-4:30 p.m    Thursday-[place]  7:30 a.m-4:30 p.m    Typical Surgery Days: Tuesday or Friday [facility] [place]  [address] [place] [address]  PH: [phone]     Imaging Scheduling all locations  PH: [phone]     [facility] Labor and Delivery  [address] [place] [address] [place] [address]  PH: [phone]    [facility]  [address] [place] [address]  PH: [phone] Fax      **Surgeries** Our Surgery Schedulers will contact you to schedule. If you do not receive a call within 3 business days, please call [phone].    Urgent Care locations:  [facility] Monday-Friday  10 am - 8 pm  Saturday and Sunday   9 am - 5 pm  Monday-Friday   10 am - 8 pm  Saturday and Sunday   9 am - 5 pm   [phone]   If you need a medication refill, please contact your pharmacy. Please allow 3 business days for your refill to be completed.  As always, Thank you for trusting us with your healthcare needs!    see additional instructions from your care team below

## 2022-08-26 ENCOUNTER — VIRTUAL VISIT (OUTPATIENT)
Dept: EDUCATION SERVICES | Facility: CLINIC | Age: 42
End: 2022-08-26
Payer: COMMERCIAL

## 2022-08-26 DIAGNOSIS — O24.414 INSULIN CONTROLLED GESTATIONAL DIABETES MELLITUS (GDM) IN SECOND TRIMESTER: Primary | ICD-10-CM

## 2022-08-26 PROCEDURE — G0108 DIAB MANAGE TRN  PER INDIV: HCPCS | Mod: AE

## 2022-08-26 NOTE — PATIENT INSTRUCTIONS
Increase insulin (Levemir) to 26 units today.  Continue to increase by 2 units every 2-3 days until fasting blood sugar is 95 mg/dL or less.      Check glucose 4 times daily.  Check ketones once a week when readings are consistently negative.  Continue with recommended physical activity.  Continue to follow recommended meal plan: 2-3 carbs (30-45 grams) at breakfast, 3-4 carbs (45-60 grams) at lunch, 3-4 carbs (45-60 grams) at supper, 1-2 carbs (15-30 grams) at snacks.  Follow consistent CHO meal plan, eat CHO and protein/fat at all meals/snacks.

## 2022-08-26 NOTE — PROGRESS NOTES
Diabetes Self-Management Education & Support  Video Visit: 30m 48sec    SUBJECTIVE/OBJECTIVE:  Presents for education related to gestational diabetes.    Accompanied by: Self  Gestational weeks: 20 weeks 6 days  Number of previous pregnancies: 3  Had any babies over 9 lbs: No  Previously had Gestational Diabetes: No  Hypertension : No  High Cholesterol: No  High Triglycerides: No  Do you use tobacco products?: No  Do you drink beer, wine or hard liquor?: No    Cultural Influences/Ethnic Background:  Not  or       LMP 04/02/2022       Wt Readings from Last 5 Encounters:   08/25/22 94.8 kg (209 lb)   07/27/22 91.7 kg (202 lb 3 oz)   06/29/22 88 kg (194 lb)       Estimated Date of Delivery: Jan 7, 2023    Blood Glucose/Ketone Log:     Date  Ketones FBG 1 hours post Breakfast 1 hour post lunch    1 hours post dinner   8/26  109      8/25  116 113 128 *after 2 hrs 149 *after 1 Hr   8/24  102 157 125 150   8/23  104 175 138 122   8/22   113 180 150 160   8/21  111 157 153 114 *1 Hr after   8/20  136 168 119 *2 Hrs after 149 *1 Hr after         Diabetes Medication(s)     Insulin       insulin detemir (LEVEMIR PEN) 100 UNIT/ML pen    Start with 8 units at bedtime, if fasting BG great than 95 x 2 days increase dose by 2 units every 2 days until fasting BG below 95. Max TDD 40 units.      Is up to 22 units today.    Lifestyle and Health Behaviors:  Pre-pregnancy weight (lbs): 185  Exercise:: Yes (has not been exercising)  Days per week of moderate to strenuous exercise (like a brisk walk): 4  On average, minutes per day of exercise at this level: 30  How intense was your typical exercise? : Light (like stretching or slow walking)  Exercise Minutes per Week: 120  Barrier to exercise: None  Meals include: Breakfast, Lunch, Dinner  Breakfast: 6-615 (wakes)-instant oatmeal 13 grams CHO plain or 1/2 C oatmeal 37 grams CHO or Belvita 35 grams CHO OR 2 eggs or toast whole grain  Lunch: 1-2pm: rice soup + grains/beans  OR greens + fish/goat/beef OR canned beans with fish + bulgur or rice  Dinner: Watermelon + yogurt OR cereal with milk + vegetables OR PB sandwich  Snacks: fruit  Other: Stopped eating rice because BG were higher, started eating Bulgur  Beverages: Tea, Water  Pre- vitamin?: Yes  Supplements?: Yes  List supplements currently taking: Iron    Healthy Coping:  Emotional response to diabetes: Ready to learn  Informal Support system:: Family  Stage of change: ACTION (Actively working towards change)    Current Management:  Taking medications for gestational diabetes?: No  Difficulty affording diabetes medication?: No    ASSESSMENT:  Ketones: Negative.   Fasting blood glucoses: 0% in target.  After breakfast: 16% in target.  After lunch: 50% in target.  After dinner: 33% in target.    BG is trending down when compared with last week.  Still not in target most of the time, recommend increasing insulin to 26 units today and then 2 units every 2-3 days until fasting BG is 95 mg/dL or less.    Reviewed dietary intake and CHO counting.  Steve was able to demonstrate ability.  She continues to walk for activity.    INTERVENTION:  Educational topics covered today:  CHO counting, Pathophysiology of GDM    Educational Materials provided today:  No new materials provided today    PLAN:  Check glucose 4 times daily.  Check ketones once a week when readings are consistently negative.  Continue with recommended physical activity.  Continue to follow recommended meal plan: 2-3 carbs (30-45 grams) at breakfast, 3-4 carbs (45-60 grams) at lunch, 3-4 carbs (45-60 grams) at supper, 1-2 carbs (15-30 grams) at snacks.  Follow consistent CHO meal plan, eat CHO and protein/fat at all meals/snacks.    Call/e-mail/[employer] message diabetes educator if 3 or more blood sugars are above the goal in 1 week or if ketones are positive.    Jenny Lizama RDN, LD  Outpatient Diabetes Education  Adult Diabetes Education Triage  [phone]    Time Spent: 30 minutes  Encounter Type: Individual    Any diabetes medication dose changes were made via the CDE Protocol and Collaborative Practice Agreement with the patient's referring provider and OB/GYN provider. A copy of this encounter was shared with the provider.

## 2022-08-26 NOTE — LETTER
8/26/2022         RE: Steve Pena  [address] [address]        Dear Colleague,    Thank you for referring your patient, Steve Pena, to the [facility] DIABETES EDUC[facility] [place]. Please see a copy of my visit note below.    Diabetes Self-Management Education & Support  Video Visit: 30m 48sec    SUBJECTIVE/OBJECTIVE:  Presents for education related to gestational diabetes.    Accompanied by: Self  Gestational weeks: 20 weeks 6 days  Number of previous pregnancies: 3  Had any babies over 9 lbs: No  Previously had Gestational Diabetes: No  Hypertension : No  High Cholesterol: No  High Triglycerides: No  Do you use tobacco products?: No  Do you drink beer, wine or hard liquor?: No    Cultural Influences/Ethnic Background:  Not  or       LMP 04/02/2022       Wt Readings from Last 5 Encounters:   08/25/22 94.8 kg (209 lb)   07/27/22 91.7 kg (202 lb 3 oz)   06/29/22 88 kg (194 lb)       Estimated Date of Delivery: Jan 7, 2023    Blood Glucose/Ketone Log:     Date  Ketones FBG 1 hours post Breakfast 1 hour post lunch    1 hours post dinner   8/26  109      8/25  116 113 128 *after 2 hrs 149 *after 1 Hr   8/24  102 157 125 150   8/23  104 175 138 122   8/22   113 180 150 160   8/21  111 157 153 114 *1 Hr after   8/20  136 168 119 *2 Hrs after 149 *1 Hr after         Diabetes Medication(s)     Insulin       insulin detemir (LEVEMIR PEN) 100 UNIT/ML pen    Start with 8 units at bedtime, if fasting BG great than 95 x 2 days increase dose by 2 units every 2 days until fasting BG below 95. Max TDD 40 units.      Is up to 22 units today.    Lifestyle and Health Behaviors:  Pre-pregnancy weight (lbs): 185  Exercise:: Yes (has not been exercising)  Days per week of moderate to strenuous exercise (like a brisk walk): 4  On average, minutes per day of exercise at this level: 30  How intense was your typical exercise? : Light (like stretching or slow walking)  Exercise Minutes per Week:  120  Barrier to exercise: None  Meals include: Breakfast, Lunch, Dinner  Breakfast: 6-615 (wakes)-instant oatmeal 13 grams CHO plain or 1/2 C oatmeal 37 grams CHO or Belvita 35 grams CHO OR 2 eggs or toast whole grain  Lunch: 1-2pm: rice soup + grains/beans OR greens + fish/goat/beef OR canned beans with fish + bulgur or rice  Dinner: Watermelon + yogurt OR cereal with milk + vegetables OR PB sandwich  Snacks: fruit  Other: Stopped eating rice because BG were higher, started eating Bulgur  Beverages: Tea, Water  Pre-rao vitamin?: Yes  Supplements?: Yes  List supplements currently taking: Iron    Healthy Coping:  Emotional response to diabetes: Ready to learn  Informal Support system:: Family  Stage of change: ACTION (Actively working towards change)    Current Management:  Taking medications for gestational diabetes?: No  Difficulty affording diabetes medication?: No    ASSESSMENT:  Ketones: Negative.   Fasting blood glucoses: 0% in target.  After breakfast: 16% in target.  After lunch: 50% in target.  After dinner: 33% in target.    BG is trending down when compared with last week.  Still not in target most of the time, recommend increasing insulin to 26 units today and then 2 units every 2-3 days until fasting BG is 95 mg/dL or less.    Reviewed dietary intake and CHO counting.  Steve was able to demonstrate ability.  She continues to walk for activity.    INTERVENTION:  Educational topics covered today:  CHO counting, Pathophysiology of GDM    Educational Materials provided today:  No new materials provided today    PLAN:  Check glucose 4 times daily.  Check ketones once a week when readings are consistently negative.  Continue with recommended physical activity.  Continue to follow recommended meal plan: 2-3 carbs (30-45 grams) at breakfast, 3-4 carbs (45-60 grams) at lunch, 3-4 carbs (45-60 grams) at supper, 1-2 carbs (15-30 grams) at snacks.  Follow consistent CHO meal plan, eat CHO and protein/fat at all  meals/snacks.    Call/e-mail/MyChart message diabetes educator if 3 or more blood sugars are above the goal in 1 week or if ketones are positive.    Jenny Lizama RDN, LD  Outpatient Diabetes Education  Adult Diabetes Education Triage [phone]    Time Spent: 30 minutes  Encounter Type: Individual    Any diabetes medication dose changes were made via the CDE Protocol and Collaborative Practice Agreement with the patient's referring provider and OB/GYN provider. A copy of this encounter was shared with the provider.

## 2022-08-28 PROBLEM — O24.410 DIET CONTROLLED GESTATIONAL DIABETES MELLITUS (GDM): Status: RESOLVED | Noted: 2022-07-24 | Resolved: 2022-08-28

## 2022-08-28 NOTE — PROGRESS NOTES
Patient presents for routine prenatal visit at 21w1d.  Patient without complaint. Started on insulin.  Discussed difference in monitoring/delivery timing for IDDM.  PE: See OB vitals  Body mass index is 38.23 kg/m .    No nausea/vomiting. No heartburn.  No vaginal bleeding, no contractions/severe cramping, no leakage of fluid.  No vaginal discharge. No dysuria  No headache, vision changes, lower extremity swelling, upper abdominal pain, chest pain, shortness of breath.   Screening ultrasound done with M  Questions asked and answered.      Carlos Jay MD FACOG

## 2022-09-02 ENCOUNTER — TELEPHONE (OUTPATIENT)
Dept: EDUCATION SERVICES | Facility: CLINIC | Age: 42
End: 2022-09-02

## 2022-09-02 ENCOUNTER — VIRTUAL VISIT (OUTPATIENT)
Dept: EDUCATION SERVICES | Facility: CLINIC | Age: 42
End: 2022-09-02
Payer: COMMERCIAL

## 2022-09-02 DIAGNOSIS — O24.414 INSULIN CONTROLLED GESTATIONAL DIABETES MELLITUS (GDM) IN SECOND TRIMESTER: ICD-10-CM

## 2022-09-02 DIAGNOSIS — O24.414 INSULIN CONTROLLED GESTATIONAL DIABETES MELLITUS (GDM) IN SECOND TRIMESTER: Primary | ICD-10-CM

## 2022-09-02 PROCEDURE — G0108 DIAB MANAGE TRN  PER INDIV: HCPCS | Mod: 95

## 2022-09-02 NOTE — PROGRESS NOTES
Diabetes Self-Management Education & Support  Video converted to Telephone visit: 52 minutes    SUBJECTIVE/OBJECTIVE:  Presents for education related to gestational diabetes.    Accompanied by: Self  Gestational weeks: 21 weeks 6 days  Next OB Visit Date: 22  Number of previous pregnancies: 3  Had any babies over 9 lbs: No  Previously had Gestational Diabetes: No  Hypertension : No  High Cholesterol: No  High Triglycerides: No  Do you use tobacco products?: No  Do you drink beer, wine or hard liquor?: No    Cultural Influences/Ethnic Background:  Not  or       LMP 2022         Estimated Date of Delivery: 2023    Blood Glucose/Ketone Log:     Date  Ketones FBG 1 hours post Breakfast 1 hour post lunch    1 hours post dinner     113 2 Hrs after 124 180 133     109 136 137 128     103 2 Hrs after 100 2 Hrs after 177 144     101 2 Hrs after 103 2 Hrs after 94 2Hrs 137      113 2 Hrs after 134 114 128     103 132 157 151   9/  118 136                 Lifestyle and Health Behaviors:  Pre-pregnancy weight (lbs): 185  Exercise:: Yes (has not been exercising)  Days per week of moderate to strenuous exercise (like a brisk walk): 4  On average, minutes per day of exercise at this level: 30  How intense was your typical exercise? : Light (like stretching or slow walking)  Exercise Minutes per Week: 120  Barrier to exercise: None  Meals include: Breakfast, Lunch, Dinner  Breakfast: 6-615 (wakes)-instant oatmeal 13 grams CHO plain or 1/2 C oatmeal 37 grams CHO or Belvita 35 grams CHO OR 2 eggs or toast whole grain  Lunch: 1-2pm: rice soup + grains/beans OR greens + fish/goat/beef OR canned beans with fish + bulgur or rice  Dinner: Watermelon + yogurt OR cereal with milk + vegetables OR PB sandwich  Snacks: fruit  Other: Stopped eating rice because BG were higher, started eating Bulgur  Beverages: Tea, Water  Pre- vitamin?: Yes  Supplements?: Yes  List supplements  currently taking: Iron    Healthy Coping:  Emotional response to diabetes: Ready to learn  Informal Support system:: Family  Stage of change: ACTION (Actively working towards change)    Current Management:  Taking medications for gestational diabetes?: No  Difficulty affording diabetes medication?: No    ASSESSMENT:  Ketones: Did not review today during visit.   Fasting blood glucoses: 0% in target.  After breakfast: 100% in target.  After lunch: 50% in target.  After dinner: 67% in target.    Diabetes Medication(s)     Insulin       insulin detemir (LEVEMIR PEN) 100 UNIT/ML pen    Start with 8 units at bedtime, if fasting BG great than 95 x 2 days increase dose by 2 units every 2 days until fasting BG below 95. Max TDD 40 units.          Is up to 32 units insulin last night (0.33 units/kg).  Fasting and after meal BG continue to trend down, but are not in target yet.  Recommend increase to 36 units on Sunday.    Typically eats traditional  food and is having a difficult time with food options.  Reviewed meal balance and discussed meal/snack ideas in detail.  Some food insecurity is apparent.  Steve continues to do well with portion control and carbohydrate counting.  Food options/variety is somewhat limited.  Steve does receive food vouchers and education from the [employer] program.  She asks about evaporated milk from milk- 4 ounces is one serving or about 15 grams.    INTERVENTION:  Educational topics covered today:  CHO counting, meal balance and meal ideas.    Educational Materials provided today:  No new materials provided today    PLAN:  Increase Insulin to 36 units on Sunday.  Check glucose 4 times daily.  Check ketones once a week when readings are consistently negative.  Continue with recommended physical activity.  Continue to follow recommended meal plan: 2-3 carbs at breakfast, 3-4 carbs at lunch, 3-4 carbs at supper, 1-2 carbs at snacks.  Follow consistent CHO meal plan, eat CHO and protein/fat at all  meals/snacks.    Call/e-mail/MyChart message diabetes educator if 3 or more blood sugars are above the goal in 1 week or if ketones are positive.    Jenny Lizama RDN, LD  Outpatient Diabetes Education  Adult Diabetes Education Triage [phone]      Time Spent: 60 minutes  Encounter Type: Individual    Any diabetes medication dose changes were made via the CDE Protocol and Collaborative Practice Agreement with the patient's referring provider. A copy of this encounter was shared with the provider.

## 2022-09-02 NOTE — LETTER
9/2/2022         RE: Steve Pena  [address] [facility] [address]        Dear Colleague,    Thank you for referring your patient, Steve Pena, to the [facility] DIABETES EDUCATION [place]. Please see a copy of my visit note below.    Diabetes Self-Management Education & Support  Video converted to Telephone visit: 52 minutes    SUBJECTIVE/OBJECTIVE:  Presents for education related to gestational diabetes.    Accompanied by: Self  Gestational weeks: 21 weeks 6 days  Next OB Visit Date: 09/26/22  Number of previous pregnancies: 3  Had any babies over 9 lbs: No  Previously had Gestational Diabetes: No  Hypertension : No  High Cholesterol: No  High Triglycerides: No  Do you use tobacco products?: No  Do you drink beer, wine or hard liquor?: No    Cultural Influences/Ethnic Background:  Not  or       LMP 04/02/2022         Estimated Date of Delivery: Jan 7, 2023    Blood Glucose/Ketone Log:     Date  Ketones FBG 1 hours post Breakfast 1 hour post lunch    1 hours post dinner   8/27  113 2 Hrs after 124 180 133   8/28  109 136 137 128   8/29  103 2 Hrs after 100 2 Hrs after 177 144   8/30  101 2 Hrs after 103 2 Hrs after 94 2Hrs 137   8/31   113 2 Hrs after 134 114 128   9/1  103 132 157 151   9/2  118 136                 Lifestyle and Health Behaviors:  Pre-pregnancy weight (lbs): 185  Exercise:: Yes (has not been exercising)  Days per week of moderate to strenuous exercise (like a brisk walk): 4  On average, minutes per day of exercise at this level: 30  How intense was your typical exercise? : Light (like stretching or slow walking)  Exercise Minutes per Week: 120  Barrier to exercise: None  Meals include: Breakfast, Lunch, Dinner  Breakfast: 6-615 (wakes)-instant oatmeal 13 grams CHO plain or 1/2 C oatmeal 37 grams CHO or Belvita 35 grams CHO OR 2 eggs or toast whole grain  Lunch: 1-2pm: rice soup + grains/beans OR greens + fish/goat/beef OR canned beans with fish + bulgur or  rice  Dinner: Watermelon + yogurt OR cereal with milk + vegetables OR PB sandwich  Snacks: fruit  Other: Stopped eating rice because BG were higher, started eating Bulgur  Beverages: Tea, Water  Pre- vitamin?: Yes  Supplements?: Yes  List supplements currently taking: Iron    Healthy Coping:  Emotional response to diabetes: Ready to learn  Informal Support system:: Family  Stage of change: ACTION (Actively working towards change)    Current Management:  Taking medications for gestational diabetes?: No  Difficulty affording diabetes medication?: No    ASSESSMENT:  Ketones: Did not review today during visit.   Fasting blood glucoses: 0% in target.  After breakfast: 100% in target.  After lunch: 50% in target.  After dinner: 67% in target.    Diabetes Medication(s)     Insulin       insulin detemir (LEVEMIR PEN) 100 UNIT/ML pen    Start with 8 units at bedtime, if fasting BG great than 95 x 2 days increase dose by 2 units every 2 days until fasting BG below 95. Max TDD 40 units.          Is up to 32 units insulin last night (0.33 units/kg).  Fasting and after meal BG continue to trend down, but are not in target yet.  Recommend increase to 36 units on .    Typically eats traditional  food and is having a difficult time with food options.  Reviewed meal balance and discussed meal/snack ideas in detail.  Some food insecurity is apparent.  Steve continues to do well with portion control and carbohydrate counting.  Food options/variety is somewhat limited.  Steve does receive food vouchers and education from the [facility] program.  She asks about evaporated milk from milk- 4 ounces is one serving or about 15 grams.    INTERVENTION:  Educational topics covered today:  CHO counting, meal balance and meal ideas.    Educational Materials provided today:  No new materials provided today    PLAN:  Increase Insulin to 36 units on .  Check glucose 4 times daily.  Check ketones once a week when readings are  consistently negative.  Continue with recommended physical activity.  Continue to follow recommended meal plan: 2-3 carbs at breakfast, 3-4 carbs at lunch, 3-4 carbs at supper, 1-2 carbs at snacks.  Follow consistent CHO meal plan, eat CHO and protein/fat at all meals/snacks.    Call/e-mail/MyChart message diabetes educator if 3 or more blood sugars are above the goal in 1 week or if ketones are positive.    Jenny Lizama RDN, LD  Outpatient Diabetes Education  Adult Diabetes Education Triage [phone]      Time Spent: 60 minutes  Encounter Type: Individual    Any diabetes medication dose changes were made via the CDE Protocol and Collaborative Practice Agreement with the patient's referring provider. A copy of this encounter was shared with the provider.

## 2022-09-02 NOTE — PATIENT INSTRUCTIONS
Increase Insulin to 36 units on Sunday.  Check glucose 4 times daily.  Check ketones once a week when readings are consistently negative.  Continue with recommended physical activity.  Continue to follow recommended meal plan: 2-3 carbs at breakfast, 3-4 carbs at lunch, 3-4 carbs at supper, 1-2 carbs at snacks.  Follow consistent CHO meal plan, eat CHO and protein/fat at all meals/snacks.    Call/e-mail/MyChart message diabetes educator if 3 or more blood sugars are above the goal in 1 week or if ketones are positive.

## 2022-09-07 NOTE — PROGRESS NOTES
SUBJECTIVE:  Steve Pena is a 41 year old pregnant female with gestational diabetes who is seen as self referral, for Right hand problems x a few weeks  She gets locking of the 4th fingers frequently. Very painful.  No numbness and tingling.       Treatment: none      Past Medical History:   Diagnosis Date     No pertinent past medical history       Past Surgical History:   Procedure Laterality Date     NO HISTORY OF SURGERY          REVIEW OF SYSTEMS:  CONSTITUTIONAL:  NEGATIVE for fever, chills, change in weight  INTEGUMENTARY/SKIN:  NEGATIVE for worrisome rashes, moles or lesions  EYES:  NEGATIVE for vision changes or irritation  ENT/MOUTH:  NEGATIVE for ear, mouth and throat problems  RESP:  NEGATIVE for significant cough or SOB  BREAST:  NEGATIVE for masses, tenderness or discharge  CV:  NEGATIVE for chest pain, palpitations or peripheral edema  GI:  NEGATIVE for nausea, abdominal pain, heartburn, or change in bowel habits  :  Negative   MUSCULOSKELETAL:  See HPI above  NEURO:  See HPI above  ENDOCRINE:  NEGATIVE for temperature intolerance, skin/hair changes  HEME/ALLERGY/IMMUNE:  NEGATIVE for bleeding problems  PSYCHIATRIC:  NEGATIVE for changes in mood or affect    EXAM:  GENERAL APPEARANCE: healthy, alert and no distress   GAIT: NORMAL  PSYCH:  mentation appears normal and affect normal/bright  SKIN: no suspicious lesions or rashes  NEURO: reflexes normal in upper extremities.   Vascular: Good capp refill and pulses  RESP: No increased work of breathing  LYMPH:  No lymphedema    MSK/Neuro:  Right 4th finger locks, painful  Tender 4th A1 Pulley.   Sensation intact ,     She also mentions some cramping in the right shoulder periodically with overhead movements.  No weakness. No neck pain.     Exam:  full range of motion  No crepitations  5/5 rotator cuff strength.  Negative impingement signs.  When she actively flexes the shoulder up fully, then rotates it around, she develops cramping in her deltoid, and  fasciculations are noted.  Full cervical range of motion without pain or reproduction of shoulder symptoms.     ASSESSMENT/PLAN  We discussed the options for trigger finger: injection vs trigger finger release. She elected to have an injection  Procedure Note:   Informed consent obtained. Risks, benefits and complications of the injection were discussed with the patient and the patient elected to proceed. A Right 4th trigger finger/flexor tenosynovial, A1 pulley- area steroid injection was performed using 0.5 cc  Kenalog-40 40mg per mL after sterile prep. This was tolerated well by the patient.     physical therapy was ordered for the shoulder. I think the rotator cuff is intact. I brought up possible electrolyte imbalance causing cramping. She does drink 1 gallon of water a day, due to thirst and she should check with her primary care provider about those issues.       Return to clinic as needed     DEEPTI Smalls MD  Dept. Orthopedic Surgery  [facility]

## 2022-09-08 ENCOUNTER — OFFICE VISIT (OUTPATIENT)
Dept: ORTHOPEDICS | Facility: CLINIC | Age: 42
End: 2022-09-08
Payer: COMMERCIAL

## 2022-09-08 VITALS — SYSTOLIC BLOOD PRESSURE: 102 MMHG | HEART RATE: 74 BPM | DIASTOLIC BLOOD PRESSURE: 62 MMHG | OXYGEN SATURATION: 98 %

## 2022-09-08 DIAGNOSIS — M25.511 RIGHT SHOULDER PAIN, UNSPECIFIED CHRONICITY: Primary | ICD-10-CM

## 2022-09-08 DIAGNOSIS — M65.30 TRIGGER FINGER, ACQUIRED: ICD-10-CM

## 2022-09-08 DIAGNOSIS — R25.2 MUSCLE CRAMPING: ICD-10-CM

## 2022-09-08 PROCEDURE — 99203 OFFICE O/P NEW LOW 30 MIN: CPT | Mod: 25 | Performed by: ORTHOPAEDIC SURGERY

## 2022-09-08 PROCEDURE — 20550 NJX 1 TENDON SHEATH/LIGAMENT: CPT | Mod: F8 | Performed by: ORTHOPAEDIC SURGERY

## 2022-09-08 RX ORDER — TRIAMCINOLONE ACETONIDE 40 MG/ML
20 INJECTION, SUSPENSION INTRA-ARTICULAR; INTRAMUSCULAR
Status: DISCONTINUED | OUTPATIENT
Start: 2022-09-08 | End: 2023-02-21

## 2022-09-08 RX ADMIN — TRIAMCINOLONE ACETONIDE 20 MG: 40 INJECTION, SUSPENSION INTRA-ARTICULAR; INTRAMUSCULAR at 16:37

## 2022-09-08 ASSESSMENT — PAIN SCALES - GENERAL: PAINLEVEL: SEVERE PAIN (6)

## 2022-09-08 NOTE — LETTER
9/8/2022         RE: Steve Pena  [address] [facility] [address]        Dear Colleague,    Thank you for referring your patient, Steve ePna, to the [facility]. Please see a copy of my visit note below.    SUBJECTIVE:  Steve Pena is a 41 year old pregnant female with gestational diabetes who is seen as self referral, for Right hand problems x a few weeks  She gets locking of the 4th fingers frequently. Very painful.  No numbness and tingling.       Treatment: none      Past Medical History:   Diagnosis Date     No pertinent past medical history       Past Surgical History:   Procedure Laterality Date     NO HISTORY OF SURGERY          REVIEW OF SYSTEMS:  CONSTITUTIONAL:  NEGATIVE for fever, chills, change in weight  INTEGUMENTARY/SKIN:  NEGATIVE for worrisome rashes, moles or lesions  EYES:  NEGATIVE for vision changes or irritation  ENT/MOUTH:  NEGATIVE for ear, mouth and throat problems  RESP:  NEGATIVE for significant cough or SOB  BREAST:  NEGATIVE for masses, tenderness or discharge  CV:  NEGATIVE for chest pain, palpitations or peripheral edema  GI:  NEGATIVE for nausea, abdominal pain, heartburn, or change in bowel habits  :  Negative   MUSCULOSKELETAL:  See HPI above  NEURO:  See HPI above  ENDOCRINE:  NEGATIVE for temperature intolerance, skin/hair changes  HEME/ALLERGY/IMMUNE:  NEGATIVE for bleeding problems  PSYCHIATRIC:  NEGATIVE for changes in mood or affect    EXAM:  GENERAL APPEARANCE: healthy, alert and no distress   GAIT: NORMAL  PSYCH:  mentation appears normal and affect normal/bright  SKIN: no suspicious lesions or rashes  NEURO: reflexes normal in upper extremities.   Vascular: Good capp refill and pulses  RESP: No increased work of breathing  LYMPH:  No lymphedema    MSK/Neuro:  Right 4th finger locks, painful  Tender 4th A1 Pulley.   Sensation intact ,     She also mentions some cramping in the right shoulder periodically with overhead  movements.  No weakness. No neck pain.     Exam:  full range of motion  No crepitations  5/5 rotator cuff strength.  Negative impingement signs.  When she actively flexes the shoulder up fully, then rotates it around, she develops cramping in her deltoid, and fasciculations are noted.  Full cervical range of motion without pain or reproduction of shoulder symptoms.     ASSESSMENT/PLAN  We discussed the options for trigger finger: injection vs trigger finger release. She elected to have an injection  Procedure Note:   Informed consent obtained. Risks, benefits and complications of the injection were discussed with the patient and the patient elected to proceed. A Right 4th trigger finger/flexor tenosynovial, A1 pulley- area steroid injection was performed using 0.5 cc  Kenalog-40 40mg per mL after sterile prep. This was tolerated well by the patient.     physical therapy was ordered for the shoulder. I think the rotator cuff is intact. I brought up possible electrolyte imbalance causing cramping. She does drink 1 gallon of water a day, due to thirst and she should check with her primary care provider about those issues.       Return to clinic as needed     DEEPTI Smalls MD  Dept. Orthopedic Surgery  [facility]    Hand / Upper Extremity Injection/Arthrocentesis: R ring A1    Date/Time: 9/8/2022 4:37 PM  Performed by: Chucho Smalls MD  Authorized by: Chucho Smalls MD     Indications:  Pain  Needle Size:  25 G  Guidance: landmark    Approach:  Volar  Condition: trigger finger    Location:  Ring finger    Site:  R ring A1  Medications:  20 mg triamcinolone 40 MG/ML  Outcome:  Tolerated well, no immediate complications  Procedure discussed: discussed risks, benefits, and alternatives    Consent Given by:  Patient  Timeout: timeout called immediately prior to procedure    Prep: patient was prepped and draped in usual sterile fashion              Again, thank you for allowing me to  participate in the care of your patient.        Sincerely,        Chucho Smalls MD

## 2022-09-08 NOTE — PROGRESS NOTES
Hand / Upper Extremity Injection/Arthrocentesis: R ring A1    Date/Time: 9/8/2022 4:37 PM  Performed by: Chucho Smalls MD  Authorized by: Chucho Smalls MD     Indications:  Pain  Needle Size:  25 G  Guidance: landmark    Approach:  Volar  Condition: trigger finger    Location:  Ring finger    Site:  R ring A1  Medications:  20 mg triamcinolone 40 MG/ML  Outcome:  Tolerated well, no immediate complications  Procedure discussed: discussed risks, benefits, and alternatives    Consent Given by:  Patient  Timeout: timeout called immediately prior to procedure    Prep: patient was prepped and draped in usual sterile fashion

## 2022-09-13 ENCOUNTER — VIRTUAL VISIT (OUTPATIENT)
Dept: EDUCATION SERVICES | Facility: CLINIC | Age: 42
End: 2022-09-13
Payer: COMMERCIAL

## 2022-09-13 DIAGNOSIS — O24.419 GESTATIONAL DIABETES MELLITUS: Primary | ICD-10-CM

## 2022-09-13 PROCEDURE — 98968 PH1 ASSMT&MGMT NQHP 21-30: CPT | Mod: 95

## 2022-09-13 NOTE — LETTER
9/13/2022         RE: Steve Pena  [address] [facility] [address]        Dear Colleague,    Thank you for referring your patient, Steve Pena, to the [facility] DIABETES EDUCATION [place]. Please see a copy of my visit note below.    Diabetes Self-Management Education & Support  Telephone visit: 22 minutes    SUBJECTIVE/OBJECTIVE:  Presents for education related to gestational diabetes.    Accompanied by: Self  Gestational weeks: 23 weeks  Next OB Visit Date: 09/26/22  Number of previous pregnancies: 3  Had any babies over 9 lbs: No  Previously had Gestational Diabetes: No  Hypertension : No  High Cholesterol: No  High Triglycerides: No  Do you use tobacco products?: No  Do you drink beer, wine or hard liquor?: No    Cultural Influences/Ethnic Background:  Not  or       LMP 04/02/2022         Estimated Date of Delivery: Jan 7, 2023    Blood Glucose/Ketone Log:     Date  Ketones FBG 1 hours post Breakfast 1 hour post lunch    1 hours post dinner   9/2  118 136 161 128   9/3  95 145 153 148   9/4  103 112 121 136   9/5 Neg 106 121 133 145   9/6   106 92 143 135   9/7  86 134 123 136   9/8  100 116 128 * Had rice, veggies and beans with chicken  161 *2 Hrs after   9/9  110 142 181 *had rice 175     Date  Ketones FBG 1 hours post Breakfast 1 hour post lunch    1 hours post dinner   9/10  109 163 157 150   9/11  92 123 123 146   9/12 Neg 99 115 111 139   9/13  97 125 110        Lifestyle and Health Behaviors:  Pre-pregnancy weight (lbs): 185  Exercise:: Yes (has not been exercising)  Days per week of moderate to strenuous exercise (like a brisk walk): 4  On average, minutes per day of exercise at this level: 30  How intense was your typical exercise? : Light (like stretching or slow walking)  Exercise Minutes per Week: 120  Barrier to exercise: None  Meals include: Breakfast, Lunch, Dinner  Breakfast: 6-615 (wakes)-wheat toast 12 grams CHO with eggs  Lunch: 1-2pm: rice soup +  grains/beans OR greens + fish/goat/beef OR canned beans with fish + bulgur or rice  Dinner: Watermelon + yogurt OR cereal with milk + vegetables OR PB sandwich  Snacks: fruit  Other: Stopped eating rice because BG were higher, started eating Bulgur  Beverages: Tea, Water  Pre-rao vitamin?: Yes  Supplements?: Yes  List supplements currently taking: Iron    Healthy Coping:  Emotional response to diabetes: Ready to learn  Informal Support system:: Family  Stage of change: ACTION (Actively working towards change)    Current Management:  Taking medications for gestational diabetes?: Yes   Diabetes Medication(s)     Insulin       insulin detemir (LEVEMIR PEN) 100 UNIT/ML pen    Start with 8 units at bedtime, if fasting BG great than 95 x 2 days increase dose by 2 units every 2 days until fasting BG below 95. Max TDD 40 units.      Increased insulin to 40 units and has been at 40 units     Difficulty affording diabetes medication?: No    ASSESSMENT:  Ketones: Negative.   Fasting blood glucoses: 25% in target.  After breakfast: 75% in target.  After lunch: 67% in target.  After dinner: 45% in target.    Steve is taking 40 units of Levemir.  BG continue to improve, the past three days on the full 40 units have looked better.  We reviewed food selections and combination of foods with meals.  Steve is eating dinner earlier and then having a small HS snack, drinks tea before bed.  Higher morning numbers are from cream of wheat and oatmeal.  Has changed to just wheat toast (12 grams of carbs per slice) and eggs.  Drinks only water.  Will continue with 40 units and monitor BG.  Follow up in 2 weeks.    INTERVENTION:  Educational topics covered today:  What to expect after delivery, Future testing for Type 2 diabetes (2 hour OGTT at 6 week post-partum check-up and annual fasting blood glucose level), Risk of GDM and planning ahead for future pregnancies, Recommended lifestyle interventions for reducing the risk of Type 2  Diabetes, When to Call a Diabetes Educator or OB Provider    Educational Materials provided today:  [facility] Preventing Diabetes    PLAN:  Continue 40 units of Levemir daily at bedtime  Check glucose 4 times daily.  Check ketones once a week when readings are consistently negative.  Continue with recommended physical activity.  Continue to follow recommended meal plan: 2-3 carbs (30-45 grams) at breakfast, 3-4 carbs (45-60 grams) at lunch, 3-4 carbs (45-60 grams) at supper, 15-30 grams carbs at snacks.  Follow consistent CHO meal plan, eat CHO and protein/fat at all meals/snacks.    Call/e-mail/MyChart message diabetes educator if 3 or more blood sugars are above the goal in 1 week or if ketones are positive.    Jenny Lizama RDN, LD  Outpatient Diabetes Education  Adult Diabetes Education Triage [phone]    Time Spent: 22 minutes  Encounter Type: Individual    Any diabetes medication dose changes were made via the CDE Protocol and Collaborative Practice Agreement with the patient's referring provider and OB/GYN provider. A copy of this encounter was shared with the provider.

## 2022-09-13 NOTE — PROGRESS NOTES
Diabetes Self-Management Education & Support  Telephone visit: 22 minutes    SUBJECTIVE/OBJECTIVE:  Presents for education related to gestational diabetes.    Accompanied by: Self  Gestational weeks: 23 weeks  Next OB Visit Date: 09/26/22  Number of previous pregnancies: 3  Had any babies over 9 lbs: No  Previously had Gestational Diabetes: No  Hypertension : No  High Cholesterol: No  High Triglycerides: No  Do you use tobacco products?: No  Do you drink beer, wine or hard liquor?: No    Cultural Influences/Ethnic Background:  Not  or       LMP 04/02/2022         Estimated Date of Delivery: Jan 7, 2023    Blood Glucose/Ketone Log:     Date  Ketones FBG 1 hours post Breakfast 1 hour post lunch    1 hours post dinner   9/2  118 136 161 128   9/3  95 145 153 148   9/4  103 112 121 136   9/5 Neg 106 121 133 145   9/6   106 92 143 135   9/7  86 134 123 136   9/8  100 116 128 * Had rice, veggies and beans with chicken  161 *2 Hrs after   9/9  110 142 181 *had rice 175     Date  Ketones FBG 1 hours post Breakfast 1 hour post lunch    1 hours post dinner   9/10  109 163 157 150   9/11  92 123 123 146   9/12 Neg 99 115 111 139   9/13  97 125 110        Lifestyle and Health Behaviors:  Pre-pregnancy weight (lbs): 185  Exercise:: Yes (has not been exercising)  Days per week of moderate to strenuous exercise (like a brisk walk): 4  On average, minutes per day of exercise at this level: 30  How intense was your typical exercise? : Light (like stretching or slow walking)  Exercise Minutes per Week: 120  Barrier to exercise: None  Meals include: Breakfast, Lunch, Dinner  Breakfast: 6-615 (wakes)-wheat toast 12 grams CHO with eggs  Lunch: 1-2pm: rice soup + grains/beans OR greens + fish/goat/beef OR canned beans with fish + bulgur or rice  Dinner: Watermelon + yogurt OR cereal with milk + vegetables OR PB sandwich  Snacks: fruit  Other: Stopped eating rice because BG were higher, started eating Bulgur  Beverages: Tea,  Water  Pre- vitamin?: Yes  Supplements?: Yes  List supplements currently taking: Iron    Healthy Coping:  Emotional response to diabetes: Ready to learn  Informal Support system:: Family  Stage of change: ACTION (Actively working towards change)    Current Management:  Taking medications for gestational diabetes?: Yes   Diabetes Medication(s)     Insulin       insulin detemir (LEVEMIR PEN) 100 UNIT/ML pen    Start with 8 units at bedtime, if fasting BG great than 95 x 2 days increase dose by 2 units every 2 days until fasting BG below 95. Max TDD 40 units.      Increased insulin to 40 units and has been at 40 units     Difficulty affording diabetes medication?: No    ASSESSMENT:  Ketones: Negative.   Fasting blood glucoses: 25% in target.  After breakfast: 75% in target.  After lunch: 67% in target.  After dinner: 45% in target.    Steve is taking 40 units of Levemir.  BG continue to improve, the past three days on the full 40 units have looked better.  We reviewed food selections and combination of foods with meals.  Steve is eating dinner earlier and then having a small HS snack, drinks tea before bed.  Higher morning numbers are from cream of wheat and oatmeal.  Has changed to just wheat toast (12 grams of carbs per slice) and eggs.  Drinks only water.  Will continue with 40 units and monitor BG.  Follow up in 2 weeks.    INTERVENTION:  Educational topics covered today:  What to expect after delivery, Future testing for Type 2 diabetes (2 hour OGTT at 6 week post-partum check-up and annual fasting blood glucose level), Risk of GDM and planning ahead for future pregnancies, Recommended lifestyle interventions for reducing the risk of Type 2 Diabetes, When to Call a Diabetes Educator or OB Provider    Educational Materials provided today:  [facility] Preventing Diabetes    PLAN:  Continue 40 units of Levemir daily at bedtime  Check glucose 4 times daily.  Check ketones once a week when readings are  consistently negative.  Continue with recommended physical activity.  Continue to follow recommended meal plan: 2-3 carbs (30-45 grams) at breakfast, 3-4 carbs (45-60 grams) at lunch, 3-4 carbs (45-60 grams) at supper, 15-30 grams carbs at snacks.  Follow consistent CHO meal plan, eat CHO and protein/fat at all meals/snacks.    Call/e-mail/MyChart message diabetes educator if 3 or more blood sugars are above the goal in 1 week or if ketones are positive.    Jenny iLzama RDN, LD  Outpatient Diabetes Education  Adult Diabetes Education Triage [phone]    Time Spent: 22 minutes  Encounter Type: Individual    Any diabetes medication dose changes were made via the CDE Protocol and Collaborative Practice Agreement with the patient's referring provider and OB/GYN provider. A copy of this encounter was shared with the provider.

## 2022-09-26 ENCOUNTER — PRENATAL OFFICE VISIT (OUTPATIENT)
Dept: OBGYN | Facility: CLINIC | Age: 42
End: 2022-09-26
Payer: COMMERCIAL

## 2022-09-26 VITALS
HEIGHT: 62 IN | HEART RATE: 92 BPM | SYSTOLIC BLOOD PRESSURE: 109 MMHG | BODY MASS INDEX: 38.68 KG/M2 | DIASTOLIC BLOOD PRESSURE: 67 MMHG | WEIGHT: 210.2 LBS | OXYGEN SATURATION: 100 %

## 2022-09-26 DIAGNOSIS — O09.522 MULTIGRAVIDA OF ADVANCED MATERNAL AGE IN SECOND TRIMESTER: Primary | ICD-10-CM

## 2022-09-26 DIAGNOSIS — O24.414 INSULIN CONTROLLED GESTATIONAL DIABETES MELLITUS (GDM) IN SECOND TRIMESTER: ICD-10-CM

## 2022-09-26 PROCEDURE — 99207 PR PRENATAL VISIT: CPT | Performed by: NURSE PRACTITIONER

## 2022-09-26 ASSESSMENT — PAIN SCALES - GENERAL: PAINLEVEL: SEVERE PAIN (6)

## 2022-09-26 NOTE — PROGRESS NOTES
Patient presents for routine prenatal visit. Prenatal flowsheet reviewed and updated as needed.  Denies vaginal bleeding, loss of fluid, contractions or cramping. Denies headache, nausea/vomiting, upper abdominal pain, vision changes, lower extremity swelling, chest pain or shortness of breath.     Anticipatory guidance appropriate for gestational age reviewed.  PE: See OB vitals    Pregnancy complicated by:  BARBARA ASCENCIOM: Follow up scheduled with Diabetes RN this week. Blood sugars ok per patient, does not have her log with her today.  Growth ultrasound Q 4 weeks starting at 28 weeks-ordered.   Fetal ECHO: scheduled for Nov 15.    Routine prenatal care:  Discussed Tdap on return to clinic.    Questions asked and answered. Next OB visit in 4 week(s) with OB Physician.    Jessica SHERWOOD CNP

## 2022-09-26 NOTE — PATIENT INSTRUCTIONS
If you have any questions regarding your visit, Please contact your care team.     [facility]: [phone]  To Schedule an Appointment 24/7  Call: [phone] HOURS TELEPHONE NUMBER     Jessica Hahn- APRN CNP      [place]-Surgery Scheduler  Mariel-Surgery Scheduler         Monday 7:30 am-5:00 pm    Tuesday 8:00 am-4:00 pm    Wednesday 7:30 am-4:00 pm  [place]    Thursday 8:00 am-11:00 am    Friday 7:30 am-4:00 pm [address] [place] [address] [phone] ask for [facility]  [phone] Fax    Imaging Scheduling all locations  [phone]     [facility]  [address] [place] [address]   [place], [place] [id number] [phone]         Urgent Care locations:  [facility]   Monday-Friday  10 am - 8 pm  Saturday and Sunday   9 am - 5 pm     [phone]   If you need a medication refill, please contact your pharmacy. Please allow 3 business days for your refill to be completed.  As always, Thank you for trusting us with your healthcare needs!      see additional instructions from your care team below

## 2022-09-28 ENCOUNTER — VIRTUAL VISIT (OUTPATIENT)
Dept: EDUCATION SERVICES | Facility: CLINIC | Age: 42
End: 2022-09-28
Payer: COMMERCIAL

## 2022-09-28 ENCOUNTER — TELEPHONE (OUTPATIENT)
Dept: EDUCATION SERVICES | Facility: CLINIC | Age: 42
End: 2022-09-28

## 2022-09-28 DIAGNOSIS — O24.419 GESTATIONAL DIABETES MELLITUS: Primary | ICD-10-CM

## 2022-09-28 DIAGNOSIS — O24.414 INSULIN CONTROLLED GESTATIONAL DIABETES MELLITUS (GDM) IN SECOND TRIMESTER: ICD-10-CM

## 2022-09-28 PROCEDURE — 98967 PH1 ASSMT&MGMT NQHP 11-20: CPT

## 2022-09-28 NOTE — PATIENT INSTRUCTIONS
1) Increase insulin to 42 units/day.  After 3-5 days, if fasting blood sugar is above 95 mg/dL for 3 days in a row increase again to 44 units/day.     2) Continue waking as able    3) Doing great with carbohydrate recommendations!

## 2022-09-28 NOTE — PROGRESS NOTES
Diabetes Education Follow-up  Telephone visit: 19 minutes    Subjective/Objective:    Telephone check in with Steve today to review BM and insulin regimen for GDM.    Diabetes is being managed with Lifestyle (diet/activity),   Diabetes Medications :    Diabetes Medication(s)     Insulin       insulin detemir (LEVEMIR PEN) 100 UNIT/ML pen    Start with 8 units at bedtime, if fasting BG great than 95 x 2 days increase dose by 2 units every 2 days until fasting BG below 95. Max TDD 40 units.          BG/Food Log:   Date  Ketones FBG 1 hours post Breakfast 1 hour post lunch    1 hours post dinner   9/28  101 158     9/27  92 121 130 172 *Pizza for dinner- 2 slices   9/26  91 122 140 133   9/25   101 156 124 159   9/24  97 132 140 144   9/23  87 125 132 118   9/22  95 114 116 137     Date  Ketones FBG 1 hours post Breakfast 1 hour post lunch    1 hours post dinner   9/21  91 112 126 132   9/20  92 117 172 131   9/19  97 123 123 120   9/18  99 121 143 130   9/17   93 131 125 127   9/16  96 100 119 147   9/15  99 135 129 129   9/14  96 151 148 182       Assessment:    Fasting blood glucose: 40% in target.  After breakfast glucose: 80% in target.  After lunch glucose: 78% in target.  After dinner glucose: 64% in target.    If eats breakfast and no snack, BG goes higher in the afternoon.  Fasting BG is high when she doesn't have an evening snack.  Continues to do some walking.    Reported wt: 210 lbs    Plan/Response:  See Patient Instructions for co-developed, patient-stated behavior change goals.  Recommend increase insulin to 42 units/day and then to 44 units/day with 3 consecutive fasting BG above 95 mg/dL.      Jenny Lizama RDN, LD  Outpatient Diabetes Education  Adult Diabetes Education Triage [phone]        Any diabetes medication dose changes were made via the CDE Protocol and Collaborative Practice Agreement with the patient's referring provider and OB/GYN provider. A copy of this encounter was shared  with the provider.

## 2022-09-28 NOTE — LETTER
9/28/2022         RE: Steve Pena  [address] [address]        Dear Colleague,    Thank you for referring your patient, Steve Pena, to the [facility] DIABETES EDUCATION [place]. Please see a copy of my visit note below.    Diabetes Education Follow-up  Telephone visit: 19 minutes    Subjective/Objective:    Telephone check in with Steve sharma to review BM and insulin regimen for GDM.    Diabetes is being managed with Lifestyle (diet/activity),   Diabetes Medications :    Diabetes Medication(s)     Insulin       insulin detemir (LEVEMIR PEN) 100 UNIT/ML pen    Start with 8 units at bedtime, if fasting BG great than 95 x 2 days increase dose by 2 units every 2 days until fasting BG below 95. Max TDD 40 units.          BG/Food Log:   Date  Ketones FBG 1 hours post Breakfast 1 hour post lunch    1 hours post dinner   9/28  101 158     9/27  92 121 130 172 *Pizza for dinner- 2 slices   9/26  91 122 140 133   9/25   101 156 124 159   9/24  97 132 140 144   9/23  87 125 132 118   9/22  95 114 116 137     Date  Ketones FBG 1 hours post Breakfast 1 hour post lunch    1 hours post dinner   9/21  91 112 126 132   9/20  92 117 172 131   9/19  97 123 123 120   9/18  99 121 143 130   9/17   93 131 125 127   9/16  96 100 119 147   9/15  99 135 129 129   9/14  96 151 148 182       Assessment:    Fasting blood glucose: 40% in target.  After breakfast glucose: 80% in target.  After lunch glucose: 78% in target.  After dinner glucose: 64% in target.    If eats breakfast and no snack, BG goes higher in the afternoon.  Fasting BG is high when she doesn't have an evening snack.  Continues to do some walking.    Reported wt: 210 lbs    Plan/Response:  See Patient Instructions for co-developed, patient-stated behavior change goals.  Recommend increase insulin to 42 units/day and then to 44 units/day with 3 consecutive fasting BG above 95 mg/dL.      Jenny Lizama RDN, LD  Outpatient Diabetes  Education  Adult Diabetes Education Triage [phone]        Any diabetes medication dose changes were made via the CDE Protocol and Collaborative Practice Agreement with the patient's referring provider and OB/GYN provider. A copy of this encounter was shared with the provider.

## 2022-10-03 ENCOUNTER — HEALTH MAINTENANCE LETTER (OUTPATIENT)
Age: 42
End: 2022-10-03

## 2022-10-05 ENCOUNTER — TELEPHONE (OUTPATIENT)
Dept: EDUCATION SERVICES | Facility: CLINIC | Age: 42
End: 2022-10-05

## 2022-10-05 ENCOUNTER — TELEPHONE (OUTPATIENT)
Dept: EDUCATION SERVICES | Facility: CLINIC | Age: 42
End: 2022-10-05
Payer: COMMERCIAL

## 2022-10-05 DIAGNOSIS — O24.419 GESTATIONAL DIABETES MELLITUS: Primary | ICD-10-CM

## 2022-10-05 NOTE — TELEPHONE ENCOUNTER
Pt left a message that she is trying to get in-touch with Jenny to review her blood sugars. She requested a call back to review them.    Sariah Kelly RN, [facility]       no

## 2022-10-05 NOTE — TELEPHONE ENCOUNTER
Diabetes Education Follow-up    Subjective/Objective:    Telephone visit with Steve to review BG.  She called diabetes triage today to report BG and left voicemail.  Call returned today at 1:30p.    Diabetes is being managed with   Lifestyle (diet/activity), Diabetes Medications   Diabetes Medication(s)     Insulin       insulin detemir (LEVEMIR PEN) 100 UNIT/ML pen    Give 42 units of insulin at bedtime.  Max dose 44 units.          BG/Food Log:   Date  Ketones FBG 1 hours post Breakfast 1 hour post lunch    1 hours post dinner   10/5  101 153     10/4  104 169 146 143   10/3  121 87 138 140   10/2  97 119 125 137   10/1   96 132 144 167   9/30  96 122 127 137   9/29  101 114 170 *sandwich 105   9/28  101 158 148 149       Assessment:    Fasting blood glucose: 0% in target.  After breakfast glucose: 62% in target.  After lunch glucose: 57% in target.  After dinner glucose: 57% in target.    Says she is eating the same food and sometimes her BG is high.  Is doing a lot of walking.  Has a bedtime snack- bread and tsp of peanut butter.  Will try to have just protein for bedtime snack to see if that helps fasting BG.    Plan/Response:  See Patient Instructions for co-developed, patient-stated behavior change goals.  Recommend increase to insulin - 46 units daily at bedtime.    Jenny Lizama RDN, LD  Outpatient Diabetes Education  Adult Diabetes Education Triage [phone]      Any diabetes medication dose changes were made via the CDE Protocol and Collaborative Practice Agreement with the patient's OB/GYN provider. A copy of this encounter was shared with the provider.

## 2022-10-13 ENCOUNTER — ALLIED HEALTH/NURSE VISIT (OUTPATIENT)
Dept: FAMILY MEDICINE | Facility: CLINIC | Age: 42
End: 2022-10-13
Payer: COMMERCIAL

## 2022-10-13 ENCOUNTER — TELEPHONE (OUTPATIENT)
Dept: FAMILY MEDICINE | Facility: CLINIC | Age: 42
End: 2022-10-13

## 2022-10-13 DIAGNOSIS — Z23 NEED FOR VACCINATION: ICD-10-CM

## 2022-10-13 DIAGNOSIS — Z23 NEED FOR PROPHYLACTIC VACCINATION AND INOCULATION AGAINST INFLUENZA: ICD-10-CM

## 2022-10-13 DIAGNOSIS — Z23 HIGH PRIORITY FOR 2019-NCOV VACCINE: Primary | ICD-10-CM

## 2022-10-13 PROCEDURE — 99207 PR NO CHARGE NURSE ONLY: CPT

## 2022-10-13 PROCEDURE — 90686 IIV4 VACC NO PRSV 0.5 ML IM: CPT

## 2022-10-13 PROCEDURE — 0124A COVID-19,PF,PFIZER BOOSTER BIVALENT: CPT

## 2022-10-13 PROCEDURE — 90746 HEPB VACCINE 3 DOSE ADULT IM: CPT

## 2022-10-13 PROCEDURE — 90471 IMMUNIZATION ADMIN: CPT

## 2022-10-13 PROCEDURE — 90472 IMMUNIZATION ADMIN EACH ADD: CPT

## 2022-10-13 PROCEDURE — 91312 COVID-19,PF,PFIZER BOOSTER BIVALENT: CPT

## 2022-10-13 NOTE — TELEPHONE ENCOUNTER
Pt called to determine what vaccine she is due for because she needs to get it done for her immigration case.   Relayed that she is due for Hep B.    Attempted to schedule [place] visit for next week soonest opening but pt needs it sooner.    Transferred to scheduling to look at other clinics.      Diane Luevano RN  [facility]

## 2022-10-17 ENCOUNTER — ANCILLARY PROCEDURE (OUTPATIENT)
Dept: ULTRASOUND IMAGING | Facility: CLINIC | Age: 42
End: 2022-10-17
Attending: NURSE PRACTITIONER
Payer: COMMERCIAL

## 2022-10-17 DIAGNOSIS — O24.414 INSULIN CONTROLLED GESTATIONAL DIABETES MELLITUS (GDM) IN SECOND TRIMESTER: ICD-10-CM

## 2022-10-17 PROCEDURE — 76816 OB US FOLLOW-UP PER FETUS: CPT | Mod: TC | Performed by: RADIOLOGY

## 2022-10-21 ENCOUNTER — TELEPHONE (OUTPATIENT)
Dept: EDUCATION SERVICES | Facility: CLINIC | Age: 42
End: 2022-10-21

## 2022-10-21 ENCOUNTER — VIRTUAL VISIT (OUTPATIENT)
Dept: EDUCATION SERVICES | Facility: CLINIC | Age: 42
End: 2022-10-21
Payer: COMMERCIAL

## 2022-10-21 DIAGNOSIS — O24.419 GESTATIONAL DIABETES MELLITUS: Primary | ICD-10-CM

## 2022-10-21 NOTE — LETTER
10/21/2022         RE: Steve Pena  [address] [facility] [address]        Dear Colleague,    Thank you for referring your patient, Steve Pena, to the [facility] DIABETES EDUCATION [place]. Please see a copy of my visit note below.    Called at 3:30pm, patient asked that I call back in 30 min.  Called back at 4p, but Steve was still unable to talk due to an urgent issue.    Diabetes Medication(s)     Insulin       insulin detemir (LEVEMIR PEN) 100 UNIT/ML pen    Give 42 units of insulin at bedtime.  Max dose 44 units.        Reports she is currently taking 50 units of Levemir daily.  Highest BG reported as 182 mg/dL.    Recommended rescheduling for next week and again suggested we think about starting meal time insulin.  Steve states she does not want to start meal time insulin as taking insulin 4 times per day is too much.  Rescheduled visit for Tuesday 10/25.   Will reach out to provider to update.    Jenny Lizama, BALDEV, LD  Outpatient Diabetes Education  Adult Diabetes Education Triage [phone]

## 2022-10-24 ENCOUNTER — PRENATAL OFFICE VISIT (OUTPATIENT)
Dept: OBGYN | Facility: CLINIC | Age: 42
End: 2022-10-24
Payer: COMMERCIAL

## 2022-10-24 VITALS
WEIGHT: 216 LBS | BODY MASS INDEX: 39.51 KG/M2 | HEART RATE: 101 BPM | SYSTOLIC BLOOD PRESSURE: 112 MMHG | DIASTOLIC BLOOD PRESSURE: 73 MMHG | OXYGEN SATURATION: 98 %

## 2022-10-24 DIAGNOSIS — O09.522 MULTIGRAVIDA OF ADVANCED MATERNAL AGE IN SECOND TRIMESTER: ICD-10-CM

## 2022-10-24 DIAGNOSIS — O24.414 INSULIN CONTROLLED GESTATIONAL DIABETES MELLITUS (GDM) DURING PREGNANCY, ANTEPARTUM: Primary | ICD-10-CM

## 2022-10-24 PROCEDURE — 99207 PR PRENATAL VISIT: CPT | Performed by: OBSTETRICS & GYNECOLOGY

## 2022-10-24 PROCEDURE — 90471 IMMUNIZATION ADMIN: CPT | Performed by: OBSTETRICS & GYNECOLOGY

## 2022-10-24 PROCEDURE — 90715 TDAP VACCINE 7 YRS/> IM: CPT | Performed by: OBSTETRICS & GYNECOLOGY

## 2022-10-24 NOTE — NURSING NOTE
Prior to immunization administration, verified patients identity using patient s name and date of birth. Please see Immunization Activity for additional information.     Screening Questionnaire for Adult Immunization    Are you sick today?   No   Do you have allergies to medications, food, a vaccine component or latex?   No   Have you ever had a serious reaction after receiving a vaccination?   No   Do you have a long-term health problem with heart, lung, kidney, or metabolic disease (e.g., diabetes), asthma, a blood disorder, no spleen, complement component deficiency, a cochlear implant, or a spinal fluid leak?  Are you on long-term aspirin therapy?   No   Do you have cancer, leukemia, HIV/AIDS, or any other immune system problem?   No   Do you have a parent, brother, or sister with an immune system problem?   No   In the past 3 months, have you taken medications that affect  your immune system, such as prednisone, other steroids, or anticancer drugs; drugs for the treatment of rheumatoid arthritis, Crohn s disease, or psoriasis; or have you had radiation treatments?   No   Have you had a seizure, or a brain or other nervous system problem?   No   During the past year, have you received a transfusion of blood or blood    products, or been given immune (gamma) globulin or antiviral drug?   No   For women: Are you pregnant or is there a chance you could become       pregnant during the next month?   Yes   Have you received any vaccinations in the past 4 weeks?   Yes     Immunization questionnaire was positive for at least one answer.  Notified Dr. Jay.        Per orders of Dr. Jay, injection of Tdap given by Melissa Schaffer MA. Patient instructed to remain in clinic for 15 minutes afterwards, and to report any adverse reaction to me immediately.       Screening performed by Melissa Schaffer MA on 10/24/2022 at 3:28 PM.

## 2022-10-24 NOTE — PROGRESS NOTES
Patient presents for routine prenatal visit at 29w2d.  Patient with complaint.   Blood sugars are still high.  PE: See OB vitals  There is no height or weight on file to calculate BMI.    No vaginal bleeding, loss of fluid, or contractions  Tdap given today  Questions asked and answered.  Discussed the importance of good sugar control and the fetal risks of persistent high sugars.  I agree with the Diabetic nurse that once a day insulin isn't working.  Steve does agree to start additional doses with meals.  She has an appointment with the nurse to discuss it.  Plan weekly BPP at 32 weeks.    Carlos Jay MD FACOG

## 2022-10-24 NOTE — PATIENT INSTRUCTIONS
If you have any questions regarding your visit, Please contact your care team.     [employer]: [phone]    To Schedule an Appointment 24/7  Call: [phone] HOURS TELEPHONE NUMBER     Carlos Jay MD  Medical Director    Leonardo Strickland-TRACIE Jack-Surgery Scheduler  Mariel-Surgery Scheduler               Tuesday-[place]  7:30 a.m-4:30 p.m    Thursday-[place]  7:30 a.m-4:30 p.m    Typical Surgery Days: Tuesday or Friday [facility] [place]  [address] [place] [address]  PH: [phone]     Imaging Scheduling all locations  PH: [phone]     [facility] Delivery  [address] [place] [address] [place] [address]  PH: [phone]    [facility]  [address] [place] [address]  PH: [phone] Fax      **Surgeries** Our Surgery Schedulers will contact you to schedule. If you do not receive a call within 3 business days, please call [phone].    Urgent Care locations:  [facility] Monday-Friday  10 am - 8 pm  Saturday and Sunday   9 am - 5 pm  Monday-Friday   10 am - 8 pm  Saturday and Sunday   9 am - 5 pm   [phone]   If you need a medication refill, please contact your pharmacy. Please allow 3 business days for your refill to be completed.  As always, Thank you for trusting us with your healthcare needs!    see additional instructions from your care team below

## 2022-10-25 ENCOUNTER — VIRTUAL VISIT (OUTPATIENT)
Dept: EDUCATION SERVICES | Facility: CLINIC | Age: 42
End: 2022-10-25
Payer: COMMERCIAL

## 2022-10-25 ENCOUNTER — TELEPHONE (OUTPATIENT)
Dept: EDUCATION SERVICES | Facility: CLINIC | Age: 42
End: 2022-10-25

## 2022-10-25 ENCOUNTER — MYC MEDICAL ADVICE (OUTPATIENT)
Dept: OBGYN | Facility: CLINIC | Age: 42
End: 2022-10-25

## 2022-10-25 DIAGNOSIS — O24.419 GESTATIONAL DIABETES MELLITUS: Primary | ICD-10-CM

## 2022-10-25 DIAGNOSIS — O24.414 INSULIN CONTROLLED GESTATIONAL DIABETES MELLITUS (GDM) IN SECOND TRIMESTER: ICD-10-CM

## 2022-10-25 PROCEDURE — 98968 PH1 ASSMT&MGMT NQHP 21-30: CPT | Mod: 93

## 2022-10-25 NOTE — LETTER
10/25/2022         RE: Steve Pena  [address] [address]        Dear Colleague,    Thank you for referring your patient, Steve Pena, to the [facility] DIABETES EDUCATION [place]. Please see a copy of my visit note below.    Diabetes Self-Management Education & Support    SUBJECTIVE/OBJECTIVE:  Presents for education related to gestational diabetes.    Accompanied by: Self  Gestational weeks: 29 weeks  Hospital planned for delivery: [facility]  Next OB Visit Date: 11/10/22  Number of previous pregnancies: 3  Had any babies over 9 lbs: No  Previously had Gestational Diabetes: No  Hypertension : No  High Cholesterol: No  High Triglycerides: No  Do you use tobacco products?: No  Do you drink beer, wine or hard liquor?: No    Cultural Influences/Ethnic Background:  Not  or       LMP 04/02/2022         Estimated Date of Delivery: Jan 7, 2023    Blood Glucose/Ketone Log:     Date  Ketones FBG 1 hours post Breakfast 1 hour post lunch    1 hours post dinner   10/25  115        127 172 exercised came down to 125 164 166   10/23  124 198 exercised came down to 125 160 153   10/22  120 151 161 157   10/21   111 171 exercised came down to 112 171 161   10/20  120 175 exercised came down to 110 156 165   10/19  127 181 exercised came down to 111 152 153         Ketones are negative  Exercise= walking 10-15 minutes    Lifestyle and Health Behaviors:  Pre-pregnancy weight (lbs): 185  Exercise:: Yes (has not been exercising)  Days per week of moderate to strenuous exercise (like a brisk walk): 4  On average, minutes per day of exercise at this level: 30  How intense was your typical exercise? : Light (like stretching or slow walking)  Exercise Minutes per Week: 120  Barrier to exercise: None  Meals include: Breakfast, Lunch, Dinner  Breakfast: 6-615 (wakes)-wheat toast 12 grams CHO with eggs  Lunch: 1-2pm: rice soup + grains/beans OR greens + fish/goat/beef OR canned beans  with fish + bulgur or rice  Dinner: Watermelon + yogurt OR cereal with milk + vegetables OR PB sandwich  Snacks: fruit  Other: Stopped eating rice because BG were higher, started eating Bulgur  Beverages: Tea, Water  Pre- vitamin?: Yes  Supplements?: Yes  List supplements currently taking: Iron  Experiencing nausea?: No  Experiencing heartburn?: No    Healthy Coping:  Emotional response to diabetes: Ready to learn  Informal Support system:: Family  Stage of change: ACTION (Actively working towards change)    Current Management:  Taking medications for gestational diabetes?: Yes    Diabetes Medication(s)     Insulin       insulin detemir (LEVEMIR PEN) 100 UNIT/ML pen    Give 42 units of insulin at bedtime.  Max dose 44 units.        50 units of Levemir before bed    Difficulty affording diabetes medication?: No    ASSESSMENT:  Ketones: Negative.   Fasting blood glucoses: 0% in target.  After breakfast: 0% in target.  After lunch: 0% in target.  After dinner: 0% in target.    Steve has been following recommended diet and exercise regimen.  Blood sugars continue to trend up despite use of basal insulin.  Recommend adding meal time insulin at this time.  Steve is apprehensive about adding more insulin to her regimen, but after talking with her physician this week understands the need.    Reviewed proper use of insulin pens including priming, hygiene, dosing and action of insulin.  Questions answered.    INTERVENTION:  Educational topics covered today:  What to expect after delivery, Future testing for Type 2 diabetes (2 hour OGTT at 6 week post-partum check-up and annual fasting blood glucose level), Risk of GDM and planning ahead for future pregnancies, Recommended lifestyle interventions for reducing the risk of Type 2 Diabetes, When to Call a Diabetes Educator or OB Provider    Educational Materials provided today:  [facility] Preventing Diabetes    PLAN:  Check glucose 4 times daily.  Check ketones once a week  when readings are consistently negative.  Continue with recommended physical activity.  Continue to follow recommended meal plan: 2-3 carbs (30-45 grams) at breakfast, 3-4 carbs (45-60 grams) at lunch, 3-4 carbs (45-60 grams) at supper, 15-30 carbs at snacks.  Follow consistent CHO meal plan, eat CHO and protein/fat at all meals/snacks.    Call/e-mail/MyChart message diabetes educator if 3 or more blood sugars are above the goal in 1 week or if ketones are positive.    Jenny Lizama RDN, LD  Outpatient Diabetes Education  Adult Diabetes Education Triage [phone]    Time Spent: 21 minutes  Encounter Type: Individual    Any diabetes medication dose changes were made via the CDE Protocol and Collaborative Practice Agreement with the patient's OB/GYN provider. A copy of this encounter was shared with the provider.

## 2022-10-25 NOTE — PROGRESS NOTES
Diabetes Self-Management Education & Support    SUBJECTIVE/OBJECTIVE:  Presents for education related to gestational diabetes.    Accompanied by: Self  Gestational weeks: 29 weeks  Hospital planned for delivery: [facility]  Next OB Visit Date: 11/10/22  Number of previous pregnancies: 3  Had any babies over 9 lbs: No  Previously had Gestational Diabetes: No  Hypertension : No  High Cholesterol: No  High Triglycerides: No  Do you use tobacco products?: No  Do you drink beer, wine or hard liquor?: No    Cultural Influences/Ethnic Background:  Not  or       LMP 2022         Estimated Date of Delivery: 2023    Blood Glucose/Ketone Log:     Date  Ketones FBG 1 hours post Breakfast 1 hour post lunch    1 hours post dinner   10/25  115        127 172 exercised came down to 125 164 166   10/23  124 198 exercised came down to 125 160 153   10/22  120 151 161 157   10/21   111 171 exercised came down to 112 171 161   10/20  120 175 exercised came down to 110 156 165   10/19  127 181 exercised came down to 111 152 153         Ketones are negative  Exercise= walking 10-15 minutes    Lifestyle and Health Behaviors:  Pre-pregnancy weight (lbs): 185  Exercise:: Yes (has not been exercising)  Days per week of moderate to strenuous exercise (like a brisk walk): 4  On average, minutes per day of exercise at this level: 30  How intense was your typical exercise? : Light (like stretching or slow walking)  Exercise Minutes per Week: 120  Barrier to exercise: None  Meals include: Breakfast, Lunch, Dinner  Breakfast: 6-615 (wakes)-wheat toast 12 grams CHO with eggs  Lunch: 1-2pm: rice soup + grains/beans OR greens + fish/goat/beef OR canned beans with fish + bulgur or rice  Dinner: Watermelon + yogurt OR cereal with milk + vegetables OR PB sandwich  Snacks: fruit  Other: Stopped eating rice because BG were higher, started eating Bulgur  Beverages: Tea, Water  Pre-rao vitamin?: Yes  Supplements?:  Yes  List supplements currently taking: Iron  Experiencing nausea?: No  Experiencing heartburn?: No    Healthy Coping:  Emotional response to diabetes: Ready to learn  Informal Support system:: Family  Stage of change: ACTION (Actively working towards change)    Current Management:  Taking medications for gestational diabetes?: Yes    Diabetes Medication(s)     Insulin       insulin detemir (LEVEMIR PEN) 100 UNIT/ML pen    Give 42 units of insulin at bedtime.  Max dose 44 units.        50 units of Levemir before bed    Difficulty affording diabetes medication?: No    ASSESSMENT:  Ketones: Negative.   Fasting blood glucoses: 0% in target.  After breakfast: 0% in target.  After lunch: 0% in target.  After dinner: 0% in target.    Steve has been following recommended diet and exercise regimen.  Blood sugars continue to trend up despite use of basal insulin.  Recommend adding meal time insulin at this time.  Steve is apprehensive about adding more insulin to her regimen, but after talking with her physician this week understands the need.    Reviewed proper use of insulin pens including priming, hygiene, dosing and action of insulin.  Questions answered.    INTERVENTION:  Educational topics covered today:  What to expect after delivery, Future testing for Type 2 diabetes (2 hour OGTT at 6 week post-partum check-up and annual fasting blood glucose level), Risk of GDM and planning ahead for future pregnancies, Recommended lifestyle interventions for reducing the risk of Type 2 Diabetes, When to Call a Diabetes Educator or OB Provider    Educational Materials provided today:  [facility] Preventing Diabetes    PLAN:  Check glucose 4 times daily.  Check ketones once a week when readings are consistently negative.  Continue with recommended physical activity.  Continue to follow recommended meal plan: 2-3 carbs (30-45 grams) at breakfast, 3-4 carbs (45-60 grams) at lunch, 3-4 carbs (45-60 grams) at supper, 15-30 carbs at  snacks.  Follow consistent CHO meal plan, eat CHO and protein/fat at all meals/snacks.    Call/e-mail/MyChart message diabetes educator if 3 or more blood sugars are above the goal in 1 week or if ketones are positive.    Jenny Lizama RDN, LD  Outpatient Diabetes Education  Adult Diabetes Education Triage [phone]    Time Spent: 21 minutes  Encounter Type: Individual    Any diabetes medication dose changes were made via the CDE Protocol and Collaborative Practice Agreement with the patient's OB/GYN provider. A copy of this encounter was shared with the provider.

## 2022-10-25 NOTE — PATIENT INSTRUCTIONS
1) Begin taking meal time insulin- Novolog.  Take 3 units of insulin, 5-15 minutes before each meal (up to 3 times daily).  Total units per day of Novolog= 9 units.    2) Continue taking 50 units of Levemir before bed.    3) Keep your Novolog and Levemir pens  so that you don't mix them up.  Double check the name before administering dose.      4) Continue testing blood sugars 4 times per day- no changes with testing    5) Continue recommended diet and exercise

## 2022-10-26 RX ORDER — INSULIN ASPART 100 [IU]/ML
3 INJECTION, SOLUTION INTRAVENOUS; SUBCUTANEOUS
Qty: 15 ML | Refills: 3 | Status: SHIPPED | OUTPATIENT
Start: 2022-10-26 | End: 2022-11-11

## 2022-10-28 ENCOUNTER — THERAPY VISIT (OUTPATIENT)
Dept: PHYSICAL THERAPY | Facility: CLINIC | Age: 42
End: 2022-10-28
Payer: COMMERCIAL

## 2022-10-28 DIAGNOSIS — M25.511 RIGHT SHOULDER PAIN: ICD-10-CM

## 2022-10-28 PROCEDURE — 97110 THERAPEUTIC EXERCISES: CPT | Mod: GP | Performed by: PHYSICAL THERAPIST

## 2022-10-28 PROCEDURE — 97161 PT EVAL LOW COMPLEX 20 MIN: CPT | Mod: GP | Performed by: PHYSICAL THERAPIST

## 2022-10-28 NOTE — PROGRESS NOTES
Physical Therapy Initial Evaluation  Subjective:  The history is provided by the patient. No  was used.   Patient Health History  Steve Pena being seen for Right Shoulder Pain.     Problem began: 5/28/2022.   Problem occurred: Unsure, got worse over time.   Pain is reported as 8/10 on pain scale.  General health as reported by patient is excellent.  Health conditions: currently pregnant, diabetes.   Red flags:  None as reported by patient.  Medical allergies: none.   Surgeries include:  None.        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   Primary job tasks include:  Computer work, prolonged standing, prolonged sitting, repetitive tasks and lifting/carrying.   Other job/home tasks details: [employer], medication.                Therapist Generated HPI Evaluation         Type of problem:  Right shoulder.    This is a new condition.  Condition occurred with:  Unknown cause.  Where condition occurred: for unknown reasons.  Patient reports pain:  Anterior and lateral.  Pain is described as aching   Pain radiates to:  Shoulder.   Since onset symptoms are gradually worsening.  Exacerbated by: lifting, reaching.  Relieved by: rest.      Restrictions due to condition include:  Working in normal job without restrictions.  Barriers include:  None as reported by patient.                        Objective:  System                   Shoulder Evaluation:  ROM:  AROM:    Flexion:  Left:  150    Right:  130  Extension: Left: 45Right: 30        External Rotation:  Left:  90    Right:  90                  Pain: pain with shoulder AROM abduction  Endfeel: empty end feel, normal joint mobility. no pain with OP.   Strength:    Flexion: Left:4/5 Strong/pain free    Pain:    Right: 4/5  Strong/pain free     Pain:     Abduction:  Left: 4-/5  Strong/pain free  Pain:    Right: 3+/5   Strong/painful    Pain:      External Rotation:   Left:4-/5   Strong/pain free    Pain:   Right:4-/5   Strong/pain free    Pain:     Horizontal Abduction:  Left:4/5   Strong/pain free    Pain:    Right:3+/5   Weak/painful   Pain:          Special Tests:  Special tests assessed shoulder: - Speed's B. + empty can R, - empty can L.                                           General     ROS    Assessment/Plan:    Patient is a 41 year old female with right side shoulder complaints.    Patient has the following significant findings with corresponding treatment plan.                Diagnosis 1:  R shoulder Pain  Pain -  self management, education and home program  Decreased strength - therapeutic exercise and therapeutic activities  Decreased function - therapeutic activities  Impaired posture - neuro re-education    Therapy Evaluation Codes:   1) History comprised of:   Personal factors that impact the plan of care:      Time since onset of symptoms.    Comorbidity factors that impact the plan of care are:      None.     Medications impacting care: None.  2) Examination of Body Systems comprised of:   Body structures and functions that impact the plan of care:      R shoulder.   Activity limitations that impact the plan of care are:      reaching, lifting, carrying, sleeping.  3) Clinical presentation characteristics are:   Stable/Uncomplicated.  4) Decision-Making    Low complexity using standardized patient assessment instrument and/or measureable assessment of functional outcome.  Cumulative Therapy Evaluation is: Low complexity.    Previous and current functional limitations:  (See Goal Flow Sheet for this information)    Short term and Long term goals: (See Goal Flow Sheet for this information)     Communication ability:  Patient appears to be able to clearly communicate and understand verbal and written communication and follow directions correctly.  Treatment Explanation - The following has been discussed with the patient:   RX ordered/plan of care  Anticipated outcomes  Possible risks and side effects  This patient would benefit from PT  intervention to resume normal activities.   Rehab potential is excellent.    Frequency:  1 X week, once daily  Duration:  for 8 weeks  Discharge Plan:  Achieve all LTG.  Independent in home treatment program.  Reach maximal therapeutic benefit.    Please refer to the daily flowsheet for treatment today, total treatment time and time spent performing 1:1 timed codes.

## 2022-11-03 ENCOUNTER — THERAPY VISIT (OUTPATIENT)
Dept: PHYSICAL THERAPY | Facility: CLINIC | Age: 42
End: 2022-11-03
Payer: COMMERCIAL

## 2022-11-03 ENCOUNTER — VIRTUAL VISIT (OUTPATIENT)
Dept: EDUCATION SERVICES | Facility: CLINIC | Age: 42
End: 2022-11-03
Payer: COMMERCIAL

## 2022-11-03 DIAGNOSIS — O24.419 GESTATIONAL DIABETES MELLITUS: Primary | ICD-10-CM

## 2022-11-03 DIAGNOSIS — M25.511 RIGHT SHOULDER PAIN: Primary | ICD-10-CM

## 2022-11-03 PROCEDURE — 97110 THERAPEUTIC EXERCISES: CPT | Mod: GP | Performed by: PHYSICAL THERAPIST

## 2022-11-03 PROCEDURE — 97112 NEUROMUSCULAR REEDUCATION: CPT | Mod: GP | Performed by: PHYSICAL THERAPIST

## 2022-11-03 PROCEDURE — 98967 PH1 ASSMT&MGMT NQHP 11-20: CPT | Mod: 95

## 2022-11-03 NOTE — PATIENT INSTRUCTIONS
1) Increase dose of Novolog to 5 units.   Take 5 units of Novolog, 5-15 minutes before each meal.     2) Increase dose of Levemir to 52 units before bed    3) Continue walking    Thank you,    Jenny Lizama RDN, LD  Outpatient Diabetes Education  Adult Diabetes Education Triage [phone]

## 2022-11-03 NOTE — LETTER
11/3/2022         RE: Steve Pena  [address] [address]        Dear Colleague,    Thank you for referring your patient, Steve Pena, to the [facility] DIABETES EDUCATION [place]. Please see a copy of my visit note below.    Diabetes Education Follow-up  Telephone visit  Provider location: home  Patient location: work   Start time: 9:31am  End time: 9:48am    Subjective/Objective:    Steve Pena sent in blood glucose log for review. Last date of communication was: today, 11/3/22.    Diabetes is being managed with   Lifestyle (diet/activity), Diabetes Medications   Diabetes Medication(s)     Insulin       insulin aspart (NOVOLOG FLEXPEN) 100 UNIT/ML pen    Inject 3 Units Subcutaneous 3 times daily (with meals) Take 3 units of Novolog 5-15 minutes before meals, up to 3 times per day.     insulin detemir (LEVEMIR PEN) 100 UNIT/ML pen    Give 50 units of insulin at bedtime.          BG/Food Log:   Date  Ketones FBG 1 hours post Breakfast 1 hour post lunch    1 hours post dinner   11/3 Neg 144 169     11/2  117 178 144 200   11/1  121 189 146 145   10/31  142 161 152 148   10/30   124 174 155 172   10/29  110 163 159 131   10/28  124 185, 120 after 2 hours and exercise 158 137   10/27  113 165 151 126   10/30 forgot to take basal insulin at night    Assessment:    Fasting blood glucose: 0% in target.  After breakfast glucose: 0% in target.  After lunch glucose: 0% in target.  After dinner glucose: 43% in target.      Plan/Response:  See Patient Instructions for co-developed, patient-stated behavior change goals.  Jenny Lizama RDN, LD  Outpatient Diabetes Education  Adult Diabetes Education Triage [phone]      Any diabetes medication dose changes were made via the CDE Protocol and Collaborative Practice Agreement with the patient's OB/GYN provider. A copy of this encounter was shared with the provider.

## 2022-11-03 NOTE — PROGRESS NOTES
Diabetes Education Follow-up  Telephone visit  Provider location: home  Patient location: work   Start time: 9:31am  End time: 9:48am    Subjective/Objective:    Steve Pena sent in blood glucose log for review. Last date of communication was: today, 11/3/22.    Diabetes is being managed with   Lifestyle (diet/activity), Diabetes Medications   Diabetes Medication(s)     Insulin       insulin aspart (NOVOLOG FLEXPEN) 100 UNIT/ML pen    Inject 3 Units Subcutaneous 3 times daily (with meals) Take 3 units of Novolog 5-15 minutes before meals, up to 3 times per day.     insulin detemir (LEVEMIR PEN) 100 UNIT/ML pen    Give 50 units of insulin at bedtime.          BG/Food Log:   Date  Ketones FBG 1 hours post Breakfast 1 hour post lunch    1 hours post dinner   11/3 Neg 144 169     11/2  117 178 144 200   11/1  121 189 146 145   10/31  142 161 152 148   10/30   124 174 155 172   10/29  110 163 159 131   10/28  124 185, 120 after 2 hours and exercise 158 137   10/27  113 165 151 126   10/30 forgot to take basal insulin at night    Assessment:    Fasting blood glucose: 0% in target.  After breakfast glucose: 0% in target.  After lunch glucose: 0% in target.  After dinner glucose: 43% in target.      Plan/Response:  See Patient Instructions for co-developed, patient-stated behavior change goals.  Jenny Lizama RDN, LD  Outpatient Diabetes Education  Adult Diabetes Education Triage [phone]      Any diabetes medication dose changes were made via the CDE Protocol and Collaborative Practice Agreement with the patient's OB/GYN provider. A copy of this encounter was shared with the provider.

## 2022-11-05 NOTE — PROGRESS NOTES
[facility]    OUTPATIENT Physical Therapy ORTHOPEDIC EVALUATION  PLAN OF TREATMENT FOR OUTPATIENT REHABILITATION  (COMPLETE FOR INITIAL CLAIMS ONLY)  Patient's Last Name, First Name, M.I.  YOB: 1980  Brigido Penamayte  JUANA    Provider s Name:  [facility]   Medical Record No.  [id number]   Start of Care Date:  10/28/22   Onset Date:   05/05/22   Treatment Diagnosis:  Right Shoulder Pain Medical Diagnosis:  Right shoulder pain       Goals:     10/28/22 0500   Body Part   Goals listed below are for Right Shoulder Pain   Goal #1   Goal #1 reaching   Previous Functional Level No restrictions   Current Functional Level Cannot reach ;overhead;out to the side   Performance level secondary to increased pain   STG Target Performance Reach ;overhead   Performance level with no pain   Rationale for independent personal hygiene;for dressing;for bathing;for job requirements in their work place   Due date 11/11/22   LTG Target Performance Reach;Unrestricted reaching   Performance Level with no pain   Rationale for independent personal hygiene;for dressing;for bathing;for job requirements in their work place   Due date 12/09/22       Therapy Frequency:  1x per week  Predicted Duration of Therapy Intervention:  6 weeks    Maida Walton, PT                 I CERTIFY THE NEED FOR THESE SERVICES FURNISHED UNDER        THIS PLAN OF TREATMENT AND WHILE UNDER MY CARE     (Physician attestation of this document indicates review and certification of the therapy plan).                     Certification Date From:  10/28/22   Certification Date To:  12/09/22    Referring Provider:  Chucho Smalls    Initial Assessment        See Epic Evaluation SOC Date: 10/28/22

## 2022-11-10 ENCOUNTER — THERAPY VISIT (OUTPATIENT)
Dept: PHYSICAL THERAPY | Facility: CLINIC | Age: 42
End: 2022-11-10
Payer: COMMERCIAL

## 2022-11-10 DIAGNOSIS — M25.511 RIGHT SHOULDER PAIN: Primary | ICD-10-CM

## 2022-11-10 PROCEDURE — 97110 THERAPEUTIC EXERCISES: CPT | Mod: GP | Performed by: PHYSICAL THERAPIST

## 2022-11-11 ENCOUNTER — TELEPHONE (OUTPATIENT)
Dept: EDUCATION SERVICES | Facility: CLINIC | Age: 42
End: 2022-11-11

## 2022-11-11 ENCOUNTER — VIRTUAL VISIT (OUTPATIENT)
Dept: EDUCATION SERVICES | Facility: CLINIC | Age: 42
End: 2022-11-11
Payer: COMMERCIAL

## 2022-11-11 DIAGNOSIS — O24.419 GESTATIONAL DIABETES MELLITUS: Primary | ICD-10-CM

## 2022-11-11 DIAGNOSIS — O24.414 INSULIN CONTROLLED GESTATIONAL DIABETES MELLITUS (GDM) IN SECOND TRIMESTER: ICD-10-CM

## 2022-11-11 PROCEDURE — 98967 PH1 ASSMT&MGMT NQHP 11-20: CPT | Mod: 95

## 2022-11-11 NOTE — LETTER
11/11/2022         RE: Steve Pena  [address] [facility] [address]        Dear Colleague,    Thank you for referring your patient, Steve Pena, to the [facility] DIABETES EDUCATION [place]. Please see a copy of my visit note below.    Diabetes Education Follow-up  Provider location: Home  Patient location: Home   Call time: 14 minutes    Subjective/Objective:    Telephone visit with Steve today to review BG data.  BG are trending down with addition of meal time insulin.  She has had more after meal numbers in target this past week.  Will recommend increase of Levemir to 55 units to help with fasting BG.    Diabetes is being managed with   Lifestyle (diet/activity),   Diabetes Medications     Diabetes Medication(s)     Insulin       insulin aspart (NOVOLOG FLEXPEN) 100 UNIT/ML pen    Inject 3 Units Subcutaneous 3 times daily (with meals) Take 3 units of Novolog 5-15 minutes before meals, up to 3 times per day.     insulin detemir (LEVEMIR PEN) 100 UNIT/ML pen    Give 50 units of insulin at bedtime.          BG/Food Log:     Date  Ketones FBG 1 hours post Breakfast 1 hour post lunch    1 hours post dinner   11/11  118 155 143    11/10  122 150 137 151   11/9  115 133 158 126   11/8  116 146 143 130   11/7   109 134 164 166   11/6  134 166 144 155   11/5  129 149 121 153   11/4  126 199 2 Hrs after 119 165       Assessment:    Fasting blood glucose: 0% in target.  After breakfast glucose: 37% in target.  After lunch glucose: 37% in target.  After dinner glucose: 29% in target.    Plan/Response:  Recommend increase to insulin -Levemir to 55 units before bed  Follow up in one week     Jenny Lizama RDN, LD  Outpatient Diabetes Education  Adult Diabetes Education Triage [phone]      Any diabetes medication dose changes were made via the CDE Protocol and Collaborative Practice Agreement with the patient's OB/GYN provider. A copy of this encounter was shared with the provider.

## 2022-11-11 NOTE — PROGRESS NOTES
Diabetes Education Follow-up  Provider location: Home  Patient location: Home   Call time: 14 minutes    Subjective/Objective:    Telephone visit with Steve today to review BG data.  BG are trending down with addition of meal time insulin.  She has had more after meal numbers in target this past week.  Will recommend increase of Levemir to 55 units to help with fasting BG.    Diabetes is being managed with   Lifestyle (diet/activity),   Diabetes Medications     Diabetes Medication(s)     Insulin       insulin aspart (NOVOLOG FLEXPEN) 100 UNIT/ML pen    Inject 3 Units Subcutaneous 3 times daily (with meals) Take 3 units of Novolog 5-15 minutes before meals, up to 3 times per day.     insulin detemir (LEVEMIR PEN) 100 UNIT/ML pen    Give 50 units of insulin at bedtime.          BG/Food Log:     Date  Ketones FBG 1 hours post Breakfast 1 hour post lunch    1 hours post dinner   11/11  118 155 143    11/10  122 150 137 151   11/9  115 133 158 126   11/8  116 146 143 130   11/7   109 134 164 166   11/6  134 166 144 155   11/5  129 149 121 153   11/4  126 199 2 Hrs after 119 165       Assessment:    Fasting blood glucose: 0% in target.  After breakfast glucose: 37% in target.  After lunch glucose: 37% in target.  After dinner glucose: 29% in target.    Plan/Response:  Recommend increase to insulin -Levemir to 55 units before bed  Follow up in one week     Jenny Lizama RDN, LD  Outpatient Diabetes Education  Adult Diabetes Education Triage [phone]      Any diabetes medication dose changes were made via the CDE Protocol and Collaborative Practice Agreement with the patient's OB/GYN provider. A copy of this encounter was shared with the provider.

## 2022-11-14 ENCOUNTER — PRENATAL OFFICE VISIT (OUTPATIENT)
Dept: OBGYN | Facility: CLINIC | Age: 42
End: 2022-11-14
Payer: COMMERCIAL

## 2022-11-14 ENCOUNTER — ANCILLARY PROCEDURE (OUTPATIENT)
Dept: ULTRASOUND IMAGING | Facility: CLINIC | Age: 42
End: 2022-11-14
Attending: OBSTETRICS & GYNECOLOGY
Payer: COMMERCIAL

## 2022-11-14 VITALS
WEIGHT: 216.2 LBS | DIASTOLIC BLOOD PRESSURE: 67 MMHG | SYSTOLIC BLOOD PRESSURE: 99 MMHG | HEART RATE: 71 BPM | OXYGEN SATURATION: 97 % | HEIGHT: 62 IN | BODY MASS INDEX: 39.79 KG/M2

## 2022-11-14 DIAGNOSIS — O24.414 INSULIN CONTROLLED GESTATIONAL DIABETES MELLITUS (GDM) DURING PREGNANCY, ANTEPARTUM: Primary | ICD-10-CM

## 2022-11-14 DIAGNOSIS — O24.414 INSULIN CONTROLLED GESTATIONAL DIABETES MELLITUS (GDM) DURING PREGNANCY, ANTEPARTUM: ICD-10-CM

## 2022-11-14 DIAGNOSIS — O09.523 MULTIGRAVIDA OF ADVANCED MATERNAL AGE IN THIRD TRIMESTER: ICD-10-CM

## 2022-11-14 PROCEDURE — 76819 FETAL BIOPHYS PROFIL W/O NST: CPT | Mod: TC | Performed by: RADIOLOGY

## 2022-11-14 PROCEDURE — 99207 PR PRENATAL VISIT: CPT | Performed by: NURSE PRACTITIONER

## 2022-11-14 RX ORDER — INSULIN ASPART 100 [IU]/ML
5 INJECTION, SOLUTION INTRAVENOUS; SUBCUTANEOUS
Qty: 15 ML | Refills: 3 | Status: SHIPPED | OUTPATIENT
Start: 2022-11-14 | End: 2023-02-21

## 2022-11-14 NOTE — PROGRESS NOTES
Patient presents for routine prenatal visit. Prenatal flowsheet reviewed and updated as needed.  Denies vaginal bleeding, loss of fluid, contractions or cramping. Denies headache, nausea/vomiting, upper abdominal pain, vision changes, lower extremity swelling, chest pain or shortness of breath.     Anticipatory guidance appropriate for gestational age reviewed.  PE: See OB vitals    Pregnancy complicated by:  AMA, GDMA2: Insulin changed last week, next follow up with Diab ED RN is Friday.  Fetal ECHO tomorrow    BPP today: 8/8    Questions asked and answered. Next OB visit in 1 week(s) with OB Physician.    Jessica SHERWOOD CNP

## 2022-11-14 NOTE — PATIENT INSTRUCTIONS
If you have any questions regarding your visit, Please contact your care team.     [facility]: [phone]  To Schedule an Appointment 24/7  Call: [phone]ONE [facility]     Jessica Hahn- APRN CNP      [place]-Surgery Scheduler  [place]-Surgery Scheduler         Monday 7:30 am-5:00 pm    Tuesday 8:00 am-4:00 pm    Wednesday 7:30 am-4:00 pm  [place]    Thursday 8:00 am-11:00 am    Friday 7:30 am-4:00 pm [address] [place] [address] [phone] ask for [facility]  [phone] Fax    Imaging Scheduling all locations  [phone]     [facility] and Delivery  [address] [place] [address]   [place], [place] [id number] [phone]         Urgent Care locations:  [facility]   Monday-Friday  10 am - 8 pm  Saturday and Sunday   9 am - 5 pm     [phone]   If you need a medication refill, please contact your pharmacy. Please allow 3 business days for your refill to be completed.  As always, Thank you for trusting us with your healthcare needs!      see additional instructions from your care team below

## 2022-11-15 ENCOUNTER — HOSPITAL ENCOUNTER (OUTPATIENT)
Dept: CARDIOLOGY | Facility: CLINIC | Age: 42
Discharge: HOME | End: 2022-11-15
Admitting: OBSTETRICS & GYNECOLOGY
Payer: COMMERCIAL

## 2022-11-15 DIAGNOSIS — O09.529 AMA (ADVANCED MATERNAL AGE) MULTIGRAVIDA 35+: ICD-10-CM

## 2022-11-15 DIAGNOSIS — O24.419 GDM (GESTATIONAL DIABETES MELLITUS): ICD-10-CM

## 2022-11-15 PROCEDURE — 76825 ECHO EXAM OF FETAL HEART: CPT | Mod: 26 | Performed by: PEDIATRICS

## 2022-11-15 PROCEDURE — 93325 DOPPLER ECHO COLOR FLOW MAPG: CPT

## 2022-11-15 PROCEDURE — 93325 DOPPLER ECHO COLOR FLOW MAPG: CPT | Mod: 26 | Performed by: PEDIATRICS

## 2022-11-15 PROCEDURE — 76827 ECHO EXAM OF FETAL HEART: CPT | Mod: 26 | Performed by: PEDIATRICS

## 2022-11-18 ENCOUNTER — VIRTUAL VISIT (OUTPATIENT)
Dept: EDUCATION SERVICES | Facility: CLINIC | Age: 42
End: 2022-11-18
Payer: COMMERCIAL

## 2022-11-18 DIAGNOSIS — O24.419 GESTATIONAL DIABETES MELLITUS: Primary | ICD-10-CM

## 2022-11-18 PROCEDURE — 98967 PH1 ASSMT&MGMT NQHP 11-20: CPT | Mod: 95

## 2022-11-18 NOTE — LETTER
11/18/2022         RE: Steve Pena  [address] [facility] [address]        Dear Colleague,    Thank you for referring your patient, Steve Pena, to the [facility] DIABETES EDUCATION [place]. Please see a copy of my visit note below.    Diabetes Self-Management Education & Support  Telephone visit   Provider location: Home  Patient location: Home  Call time: 8:02-8:20am    SUBJECTIVE/OBJECTIVE:  Presents for education related to gestational diabetes.    Accompanied by: Self  Gestational weeks: 33 weeks  Next OB Visit Date: 11/21/22  Number of previous pregnancies: 3  Had any babies over 9 lbs: No  Previously had Gestational Diabetes: No  Hypertension : No  High Cholesterol: No  High Triglycerides: No  Do you use tobacco products?: No  Do you drink beer, wine or hard liquor?: No    Cultural Influences/Ethnic Background:  Not  or       LMP 04/02/2022         Estimated Date of Delivery: Jan 7, 2023    Blood Glucose/Ketone Log:   Date  Ketones FBG 1 hours post Breakfast 1 hour post lunch    1 hours post dinner   11/18  118      11/17  119 148 132 136   11/16  122 171 169 158   11/15  121 162 125 159   11/14   129 155 145 151   11/13  129 147 144 145   11/12  113 118 *2 Hrs after 139 118   11/11 Neg 118 155 143 134       Lifestyle and Health Behaviors:  Pre-pregnancy weight (lbs): 185  Exercise:: Yes (has not been exercising)  Days per week of moderate to strenuous exercise (like a brisk walk): 4  On average, minutes per day of exercise at this level: 30  How intense was your typical exercise? : Light (like stretching or slow walking)  Exercise Minutes per Week: 120  Barrier to exercise: None  Meals include: Breakfast, Lunch, Dinner  Breakfast: 6-615 (wakes)-wheat toast 12 grams CHO with eggs  Lunch: 1-2pm: rice soup + grains/beans OR greens + fish/goat/beef OR canned beans with fish + bulgur or rice  Dinner: Watermelon + yogurt OR cereal with milk + vegetables OR PB sandwich  Snacks:  fruit  Other: Stopped eating rice because BG were higher, started eating Bulgur  Beverages: Tea, Water  Pre- vitamin?: Yes  Supplements?: Yes  List supplements currently taking: Iron  Experiencing nausea?: No  Experiencing heartburn?: No    Healthy Coping:  Emotional response to diabetes: Ready to learn  Informal Support system:: Family  Stage of change: ACTION (Actively working towards change)    Current Management:  Taking medications for gestational diabetes?: Yes  Difficulty affording diabetes medication?: No  Difficulty affording diabetes testing supplies?: No    Diabetes Medication(s)     Insulin       insulin aspart (NOVOLOG FLEXPEN) 100 UNIT/ML pen    Inject 5 Units Subcutaneous 3 times daily (with meals) Take 3 units of Novolog 5-15 minutes before meals, up to 3 times per day.     insulin detemir (LEVEMIR PEN) 100 UNIT/ML pen    Give 55 units of insulin at bedtime.          ASSESSMENT:  Ketones: Negative.   Fasting blood glucoses: 0% in target.  After breakfast: 14% in target.  After lunch: 43% in target.  After dinner: 43% in target.    Recommend increasing Levemir to 57 units/day.  Steve continue to walk for exercise.  Briefly talked about Thanksgiving meal next week.  Recommend continued portion control and activity.      INTERVENTION:  Educational topics covered today:  What to expect after delivery, Future testing for Type 2 diabetes (2 hour OGTT at 6 week post-partum check-up and annual fasting blood glucose level), Risk of GDM and planning ahead for future pregnancies, Recommended lifestyle interventions for reducing the risk of Type 2 Diabetes, When to Call a Diabetes Educator or OB Provider    Educational Materials provided today:  [place] Preventing Diabetes    PLAN:  Check glucose 4 times daily.  Check ketones once a week   Continue with recommended physical activity.  Continue to follow recommended meal plan: 2-3 carbs at breakfast, 3-4 carbs at lunch, 3-4 carbs at supper, 1-2 carbs at  snacks.  Follow consistent CHO meal plan, eat CHO and protein/fat at all meals/snacks.    Call/e-mail/MyChart message diabetes educator if 3 or more blood sugars are above the goal in 1 week or if ketones are positive.    Jenny Lizama RDN, LD  Outpatient Diabetes Education  Adult Diabetes Education Triage [phone]    Time Spent: 18 minutes  Encounter Type: Individual    Any diabetes medication dose changes were made via the CDE Protocol and Collaborative Practice Agreement with the patient's referring provider. A copy of this encounter was shared with the provider.

## 2022-11-18 NOTE — PATIENT INSTRUCTIONS
Increase Levemir to 57 units/day   Continue 5 units of Novolog 5-15 minutes before each meal (3 times per day)    Check glucose 4 times daily.  Check ketones once a week.  Continue with recommended physical activity.  Continue to follow recommended meal plan: 2-3 carbs at breakfast, 3-4 carbs at lunch, 3-4 carbs at supper, 1-2 carbs at snacks.  Follow consistent CHO meal plan, eat CHO and protein/fat at all meals/snacks.    Call/e-mail/L4 Mobile[employer] message diabetes educator if 3 or more blood sugars are above the goal in 1 week or if ketones are positive.    Thank you,   Jenny Lizama RDN, LD  Outpatient Diabetes Education  Adult Diabetes Education Triage [phone]

## 2022-11-18 NOTE — PROGRESS NOTES
Diabetes Self-Management Education & Support  Telephone visit   Provider location: Home  Patient location: Home  Call time: 8:02-8:20am    SUBJECTIVE/OBJECTIVE:  Presents for education related to gestational diabetes.    Accompanied by: Self  Gestational weeks: 33 weeks  Next OB Visit Date: 22  Number of previous pregnancies: 3  Had any babies over 9 lbs: No  Previously had Gestational Diabetes: No  Hypertension : No  High Cholesterol: No  High Triglycerides: No  Do you use tobacco products?: No  Do you drink beer, wine or hard liquor?: No    Cultural Influences/Ethnic Background:  Not  or       LMP 2022         Estimated Date of Delivery: 2023    Blood Glucose/Ketone Log:   Date  Ketones FBG 1 hours post Breakfast 1 hour post lunch    1 hours post dinner     118        119 148 132 136     122 171 169 158   11/15  121 162 125 159      129 155 145 151     129 147 144 145     113 118 *2 Hrs after 139 118    Neg 118 155 143 134       Lifestyle and Health Behaviors:  Pre-pregnancy weight (lbs): 185  Exercise:: Yes (has not been exercising)  Days per week of moderate to strenuous exercise (like a brisk walk): 4  On average, minutes per day of exercise at this level: 30  How intense was your typical exercise? : Light (like stretching or slow walking)  Exercise Minutes per Week: 120  Barrier to exercise: None  Meals include: Breakfast, Lunch, Dinner  Breakfast: 6-615 (wakes)-wheat toast 12 grams CHO with eggs  Lunch: 1-2pm: rice soup + grains/beans OR greens + fish/goat/beef OR canned beans with fish + bulgur or rice  Dinner: Watermelon + yogurt OR cereal with milk + vegetables OR PB sandwich  Snacks: fruit  Other: Stopped eating rice because BG were higher, started eating Bulgur  Beverages: Tea, Water  Pre-rao vitamin?: Yes  Supplements?: Yes  List supplements currently taking: Iron  Experiencing nausea?: No  Experiencing heartburn?: No    Healthy  Coping:  Emotional response to diabetes: Ready to learn  Informal Support system:: Family  Stage of change: ACTION (Actively working towards change)    Current Management:  Taking medications for gestational diabetes?: Yes  Difficulty affording diabetes medication?: No  Difficulty affording diabetes testing supplies?: No    Diabetes Medication(s)     Insulin       insulin aspart (NOVOLOG FLEXPEN) 100 UNIT/ML pen    Inject 5 Units Subcutaneous 3 times daily (with meals) Take 3 units of Novolog 5-15 minutes before meals, up to 3 times per day.     insulin detemir (LEVEMIR PEN) 100 UNIT/ML pen    Give 55 units of insulin at bedtime.          ASSESSMENT:  Ketones: Negative.   Fasting blood glucoses: 0% in target.  After breakfast: 14% in target.  After lunch: 43% in target.  After dinner: 43% in target.    Recommend increasing Levemir to 57 units/day.  Konah continue to walk for exercise.  Briefly talked about Thanksgiving meal next week.  Recommend continued portion control and activity.      INTERVENTION:  Educational topics covered today:  What to expect after delivery, Future testing for Type 2 diabetes (2 hour OGTT at 6 week post-partum check-up and annual fasting blood glucose level), Risk of GDM and planning ahead for future pregnancies, Recommended lifestyle interventions for reducing the risk of Type 2 Diabetes, When to Call a Diabetes Educator or OB Provider    Educational Materials provided today:  [facility] Preventing Diabetes    PLAN:  Check glucose 4 times daily.  Check ketones once a week   Continue with recommended physical activity.  Continue to follow recommended meal plan: 2-3 carbs at breakfast, 3-4 carbs at lunch, 3-4 carbs at supper, 1-2 carbs at snacks.  Follow consistent CHO meal plan, eat CHO and protein/fat at all meals/snacks.    Call/e-mail/[employer] message diabetes educator if 3 or more blood sugars are above the goal in 1 week or if ketones are positive.    Jenny Lizama RDN,  MIGUEL  Outpatient Diabetes Education  Adult Diabetes Education Triage [phone]    Time Spent: 18 minutes  Encounter Type: Individual    Any diabetes medication dose changes were made via the CDE Protocol and Collaborative Practice Agreement with the patient's referring provider. A copy of this encounter was shared with the provider.

## 2022-11-21 ENCOUNTER — ANCILLARY PROCEDURE (OUTPATIENT)
Dept: ULTRASOUND IMAGING | Facility: CLINIC | Age: 42
End: 2022-11-21
Attending: OBSTETRICS & GYNECOLOGY
Payer: COMMERCIAL

## 2022-11-21 ENCOUNTER — PRENATAL OFFICE VISIT (OUTPATIENT)
Dept: OBGYN | Facility: CLINIC | Age: 42
End: 2022-11-21
Payer: COMMERCIAL

## 2022-11-21 VITALS
HEART RATE: 94 BPM | WEIGHT: 215.4 LBS | OXYGEN SATURATION: 100 % | SYSTOLIC BLOOD PRESSURE: 114 MMHG | HEIGHT: 62 IN | BODY MASS INDEX: 39.64 KG/M2 | DIASTOLIC BLOOD PRESSURE: 73 MMHG

## 2022-11-21 DIAGNOSIS — O24.414 INSULIN CONTROLLED GESTATIONAL DIABETES MELLITUS (GDM) DURING PREGNANCY, ANTEPARTUM: Primary | ICD-10-CM

## 2022-11-21 DIAGNOSIS — O24.414 INSULIN CONTROLLED GESTATIONAL DIABETES MELLITUS (GDM) DURING PREGNANCY, ANTEPARTUM: ICD-10-CM

## 2022-11-21 DIAGNOSIS — O09.523 MULTIGRAVIDA OF ADVANCED MATERNAL AGE IN THIRD TRIMESTER: ICD-10-CM

## 2022-11-21 PROCEDURE — 99207 PR PRENATAL VISIT: CPT | Performed by: NURSE PRACTITIONER

## 2022-11-21 PROCEDURE — 76819 FETAL BIOPHYS PROFIL W/O NST: CPT | Mod: TC | Performed by: RADIOLOGY

## 2022-11-21 NOTE — PROGRESS NOTES
Patient presents for routine prenatal visit. Prenatal flowsheet reviewed and updated as needed.  Denies vaginal bleeding, loss of fluid, contractions or cramping. Denies headache, nausea/vomiting, upper abdominal pain, vision changes, lower extremity swelling, chest pain or shortness of breath.     Anticipatory guidance appropriate for gestational age reviewed.  PE: See OB vitals    Pregnancy complicated by:  AMA, GDMA2: Insulin increased again last week, next follow up with Diab ED RN is Wednesday  Fetal ECHO normal   Patient thought she had follow up with MFM, but nothing is scheduled, has not had growth ultrasound done recently-measurements are added to her imaging next week.    Preliminary BPP today 8/8.    Questions asked and answered. Next OB visit in 1 week(s) with OB Physician.    Jessica SHERWOOD CNP

## 2022-11-23 ENCOUNTER — TELEPHONE (OUTPATIENT)
Dept: EDUCATION SERVICES | Facility: CLINIC | Age: 42
End: 2022-11-23

## 2022-11-23 ENCOUNTER — VIRTUAL VISIT (OUTPATIENT)
Dept: EDUCATION SERVICES | Facility: CLINIC | Age: 42
End: 2022-11-23
Payer: COMMERCIAL

## 2022-11-23 DIAGNOSIS — O24.414 INSULIN CONTROLLED GESTATIONAL DIABETES MELLITUS (GDM) IN SECOND TRIMESTER: ICD-10-CM

## 2022-11-23 DIAGNOSIS — O24.419 GESTATIONAL DIABETES MELLITUS: Primary | ICD-10-CM

## 2022-11-23 PROCEDURE — 98967 PH1 ASSMT&MGMT NQHP 11-20: CPT | Mod: 95

## 2022-11-23 NOTE — PROGRESS NOTES
Diabetes Education Follow-up  Telephone visit   Provider location: Home   Patient location: work   Call time: 8:02am-8:14am    Subjective/Objective:    Follow up with Steve today for blood glucose review.  Unable to check in on time due to another user in Steve's chart.    Is getting a little bored with the same foods.  When she tries to eat a variety of grains, her BG goes up.  The 170 mg/dL post breakfast BG yesterday was after 1/2C oatmeal.  Continues to walk as able.   After meal BG are tending down overall.  Continues to have high fasting BG.  Recommend increase basal insulin today.    Diabetes is being managed with   Lifestyle (diet/activity), Diabetes Medications   Diabetes Medication(s)     Insulin       insulin aspart (NOVOLOG FLEXPEN) 100 UNIT/ML pen    Inject 5 Units Subcutaneous 3 times daily (with meals) Take 3 units of Novolog 5-15 minutes before meals, up to 3 times per day.     insulin detemir (LEVEMIR PEN) 100 UNIT/ML pen    Give 55 units of insulin at bedtime.      57 units of Levemir at bedtime       BG/Food Log:   Date  Ketones FBG 1 hours post Breakfast 1 hour post lunch    1 hours post dinner   11/23  113      11/22  124 170, 2 Hrs after 105  Ate oatmeal 1/2C 162 138   11/21  125 2 Hrs after 140 127 129   11/20  113 166 146 169   11/19   110 165 125 141   11/18  118 166 149 149   11/17  119 148 132 136       Assessment:    Fasting blood glucose: 0% in target.  After breakfast glucose: 16% in target.  After lunch glucose: 50% in target.  After dinner glucose: 50% in target.    Plan/Response:  See Patient Instructions for co-developed, patient-stated behavior change goals.  Recommend increase to insulin - 59 units Levemir before bed  Continue taking Novolog 5-15 minutes before eating, take 5 units.    Jenny Lizama RDN, LD  Outpatient Diabetes Education  Adult Diabetes Education Triage [phone]      Any diabetes medication dose changes were made via the CDE Protocol and Collaborative  Practice Agreement with the patient's OB/GYN provider. A copy of this encounter was shared with the provider.

## 2022-11-23 NOTE — LETTER
11/23/2022         RE: Steve Pena  [address] [address]        Dear Colleague,    Thank you for referring your patient, Steve Pena, to the [facility] DIABETES EDUCATION [place]. Please see a copy of my visit note below.    Diabetes Education Follow-up  Telephone visit   Provider location: Home   Patient location: work   Call time: 8:02am-8:14am    Subjective/Objective:    Follow up with Steve today for blood glucose review.  Unable to check in on time due to another user in Steve's chart.    Is getting a little bored with the same foods.  When she tries to eat a variety of grains, her BG goes up.  The 170 mg/dL post breakfast BG yesterday was after 1/2C oatmeal.  Continues to walk as able.   After meal BG are tending down overall.  Continues to have high fasting BG.  Recommend increase basal insulin today.    Diabetes is being managed with   Lifestyle (diet/activity), Diabetes Medications   Diabetes Medication(s)     Insulin       insulin aspart (NOVOLOG FLEXPEN) 100 UNIT/ML pen    Inject 5 Units Subcutaneous 3 times daily (with meals) Take 3 units of Novolog 5-15 minutes before meals, up to 3 times per day.     insulin detemir (LEVEMIR PEN) 100 UNIT/ML pen    Give 55 units of insulin at bedtime.      57 units of Levemir at bedtime       BG/Food Log:   Date  Ketones FBG 1 hours post Breakfast 1 hour post lunch    1 hours post dinner   11/23  113      11/22  124 170, 2 Hrs after 105  Ate oatmeal 1/2C 162 138   11/21  125 2 Hrs after 140 127 129   11/20  113 166 146 169   11/19   110 165 125 141   11/18  118 166 149 149   11/17  119 148 132 136       Assessment:    Fasting blood glucose: 0% in target.  After breakfast glucose: 16% in target.  After lunch glucose: 50% in target.  After dinner glucose: 50% in target.    Plan/Response:  See Patient Instructions for co-developed, patient-stated behavior change goals.  Recommend increase to insulin - 59 units Levemir before bed  Continue  taking Novolog 5-15 minutes before eating, take 5 units.    Jenny Lizama RDN, LD  Outpatient Diabetes Education  Adult Diabetes Education Triage [phone]      Any diabetes medication dose changes were made via the CDE Protocol and Collaborative Practice Agreement with the patient's OB/GYN provider. A copy of this encounter was shared with the provider.

## 2022-11-28 ENCOUNTER — PRENATAL OFFICE VISIT (OUTPATIENT)
Dept: OBGYN | Facility: CLINIC | Age: 42
End: 2022-11-28
Payer: COMMERCIAL

## 2022-11-28 ENCOUNTER — ANCILLARY PROCEDURE (OUTPATIENT)
Dept: ULTRASOUND IMAGING | Facility: CLINIC | Age: 42
End: 2022-11-28
Attending: OBSTETRICS & GYNECOLOGY
Payer: COMMERCIAL

## 2022-11-28 VITALS
WEIGHT: 216.2 LBS | BODY MASS INDEX: 39.54 KG/M2 | HEART RATE: 96 BPM | SYSTOLIC BLOOD PRESSURE: 118 MMHG | OXYGEN SATURATION: 100 % | DIASTOLIC BLOOD PRESSURE: 78 MMHG

## 2022-11-28 DIAGNOSIS — O24.414 INSULIN CONTROLLED GESTATIONAL DIABETES MELLITUS (GDM) DURING PREGNANCY, ANTEPARTUM: ICD-10-CM

## 2022-11-28 DIAGNOSIS — O09.522 MULTIGRAVIDA OF ADVANCED MATERNAL AGE IN SECOND TRIMESTER: Primary | ICD-10-CM

## 2022-11-28 DIAGNOSIS — O09.523 MULTIGRAVIDA OF ADVANCED MATERNAL AGE IN THIRD TRIMESTER: ICD-10-CM

## 2022-11-28 PROCEDURE — 99207 PR PRENATAL VISIT: CPT | Performed by: OBSTETRICS & GYNECOLOGY

## 2022-11-28 PROCEDURE — 76816 OB US FOLLOW-UP PER FETUS: CPT | Mod: TC | Performed by: RADIOLOGY

## 2022-11-28 PROCEDURE — 76819 FETAL BIOPHYS PROFIL W/O NST: CPT | Mod: TC | Performed by: RADIOLOGY

## 2022-11-28 NOTE — PROGRESS NOTES
Patient presents for routine prenatal visit at 34w2d.  Patient with complaint. Having trouble sleeping.  Discussed options such as a wedge with left tilt or a recliner.  PE: See OB vitals  There is no height or weight on file to calculate BMI.    No vaginal bleeding, loss of fluid, or contractions  Prelim ultrasound normal final pending growth and BPP  BILATERAL SALPINGECTOMY still not controlled.  Diabetic Nurse still adjusting, but suspect we will need to consider delivery 38-39 weeks.  Questions asked and answered.      Carlos Jay MD FACOG

## 2022-11-28 NOTE — PATIENT INSTRUCTIONS
If you have any questions regarding your visit, Please contact your care team.     [facility]: [phone]    To Schedule an Appointment 24/7  Call: [phone]     Carlos Jay MD  Medical Director    Leonardo Strickland-TRACIE Jack-Surgery Scheduler  Mariel-Surgery Scheduler               Tuesday-[place]  7:30 a.m-4:30 p.m    Thursday-[place]  7:30 a.m-4:30 p.m    Typical Surgery Days: Tuesday or Friday [facility] [place]  [address] [place] [address]  PH: [phone]     Imaging Scheduling all locations  PH: [phone]     [facility] Labor and Delivery  [address] [place] [address] [place] [address]  PH: [phone]    [facility]  [address] [place] [address]  PH: [phone] Fax      **Surgeries** Our Surgery Schedulers will contact you to schedule. If you do not receive a call within 3 business days, please call [phone].    Urgent Care locations:  [facility] Monday-Friday  10 am - 8 pm  Saturday and Sunday   9 am - 5 pm  Monday-Friday   10 am - 8 pm  Saturday and Sunday   9 am - 5 pm   [phone]   If you need a medication refill, please contact your pharmacy. Please allow 3 business days for your refill to be completed.  As always, Thank you for trusting us with your healthcare needs!    see additional instructions from your care team below

## 2022-11-30 ENCOUNTER — THERAPY VISIT (OUTPATIENT)
Dept: PHYSICAL THERAPY | Facility: CLINIC | Age: 42
End: 2022-11-30
Attending: ORTHOPAEDIC SURGERY
Payer: COMMERCIAL

## 2022-11-30 DIAGNOSIS — M25.511 RIGHT SHOULDER PAIN, UNSPECIFIED CHRONICITY: ICD-10-CM

## 2022-11-30 PROCEDURE — 97110 THERAPEUTIC EXERCISES: CPT | Mod: GP | Performed by: PHYSICAL THERAPIST

## 2022-11-30 NOTE — PROGRESS NOTES
Physical Therapy Initial Evaluation  Subjective:  HPI                    Objective:  System    Physical Exam    General     ROS    Assessment/Plan:    DISCHARGE REPORT    Progress reporting period is from 10/28/22 to 11/30/22.       SUBJECTIVE  Subjective changes noted by patient: Pt reports she is very tired and sore due to pregnancy/ weight of baby but her shoulder has been feeling much better and she has been consistent with her exercises    Current pain level is 0/10  .     Previous pain level was  10/10  .   Changes in function:  Yes (See Goal flowsheet attached for changes in current functional level)  Adverse reaction to treatment or activity: None    OBJECTIVE  Changes noted in objective findings:  Yes,   Objective: Shoulder strength: flexion: 5/5 Bilaterally, abduction: 5/5 bilaterally, ER: 5/5 bilaterally, IR: 5/5 bilaterally     ASSESSMENT/PLAN    STG/LTGs have been met or progress has been made towards goals:  Yes (See Goal flow sheet completed today.)  Assessment of Progress: The patient's condition is improving.  The patient has met all of their long term goals.  Self Management Plans:  Patient has been instructed in a home treatment program.  Patient is independent in a home treatment program.  Steve continues to require the following intervention to meet STG and LTG's:  PT intervention is no longer required to meet STG/LTG.    Recommendations:  This patient is ready to be discharged from therapy and continue their home treatment program.    Please refer to the daily flowsheet for treatment today, total treatment time and time spent performing 1:1 timed codes.

## 2022-12-02 ENCOUNTER — VIRTUAL VISIT (OUTPATIENT)
Dept: EDUCATION SERVICES | Facility: CLINIC | Age: 42
End: 2022-12-02
Payer: COMMERCIAL

## 2022-12-02 DIAGNOSIS — O24.419 GESTATIONAL DIABETES MELLITUS: Primary | ICD-10-CM

## 2022-12-02 PROCEDURE — 98967 PH1 ASSMT&MGMT NQHP 11-20: CPT | Mod: 95

## 2022-12-02 NOTE — LETTER
12/2/2022         RE: Steve Pena  [address] [address]        Dear Colleague,    Thank you for referring your patient, Steve Pena, to the [facility] DIABETES EDUCATION [place]. Please see a copy of my visit note below.    Diabetes Education Follow-up  Provider Location: Home   Patient Location: Work   Call time: 12  minutes    Subjective/Objective:    Telephone visit with Steve to review BG.  Less after meal BG within target this week.  She has not changed anything with her eating routine.  When she eats anything other than whole grain bread in the morning her BG spikes.  Recommend increasing rapid acting insulin.  Weekly MD visits.    Diabetes is being managed with   Lifestyle (diet/activity), Diabetes Medications     Diabetes Medication(s)     Insulin       insulin aspart (NOVOLOG FLEXPEN) 100 UNIT/ML pen    Inject 5 Units Subcutaneous 3 times daily (with meals) Take 3 units of Novolog 5-15 minutes before meals, up to 3 times per day.     insulin detemir (LEVEMIR PEN) 100 UNIT/ML pen    Give 59 units of insulin at bedtime.          BG/Food Log:   Date  Ketones FBG 1 hours post Breakfast 1 hour post lunch    1 hours post dinner   12/2  113      12/1  115 161 153 149   11/30  129 151 142 158   11/29  110 170 137 166   11/28   120 198 oatmeal 146 108   11/27  116 196 132 153   11/26  100 126 158 2 Hrs after 157   11/25  114 166 156 155       Assessment:    Fasting blood glucose: 0% in target.  After breakfast glucose: 0% in target.  After lunch glucose: 29% in target.  After dinner glucose: 14% in target.    Plan/Response:  See Patient Instructions for co-developed, patient-stated behavior change goals.  Recommend increase to insulin - Novolog by 3 units/day or 6 units before each meal.  If after meal BG continues above 150 mg/dL through Monday 12/5, increase to 7 units before each meal.     Jenny Lizama RDN, LD  Outpatient Diabetes Education  Adult Diabetes Education  Triage [phone]      Any diabetes medication dose changes were made via the CDE Protocol and Collaborative Practice Agreement with the patient's referring provider. A copy of this encounter was shared with the provider.

## 2022-12-02 NOTE — PROGRESS NOTES
Diabetes Education Follow-up  Provider Location: Home   Patient Location: Work   Call time: 12  minutes    Subjective/Objective:    Telephone visit with Steve to review BG.  Less after meal BG within target this week.  She has not changed anything with her eating routine.  When she eats anything other than whole grain bread in the morning her BG spikes.  Recommend increasing rapid acting insulin.  Weekly MD visits.    Diabetes is being managed with   Lifestyle (diet/activity), Diabetes Medications     Diabetes Medication(s)     Insulin       insulin aspart (NOVOLOG FLEXPEN) 100 UNIT/ML pen    Inject 5 Units Subcutaneous 3 times daily (with meals) Take 3 units of Novolog 5-15 minutes before meals, up to 3 times per day.     insulin detemir (LEVEMIR PEN) 100 UNIT/ML pen    Give 59 units of insulin at bedtime.          BG/Food Log:   Date  Ketones FBG 1 hours post Breakfast 1 hour post lunch    1 hours post dinner   12/2  113      12/1  115 161 153 149   11/30  129 151 142 158   11/29  110 170 137 166   11/28   120 198 oatmeal 146 108   11/27  116 196 132 153   11/26  100 126 158 2 Hrs after 157   11/25  114 166 156 155       Assessment:    Fasting blood glucose: 0% in target.  After breakfast glucose: 0% in target.  After lunch glucose: 29% in target.  After dinner glucose: 14% in target.    Plan/Response:  See Patient Instructions for co-developed, patient-stated behavior change goals.  Recommend increase to insulin - Novolog by 3 units/day or 6 units before each meal.  If after meal BG continues above 150 mg/dL through Monday 12/5, increase to 7 units before each meal.     Jenny Lizama RDN, LD  Outpatient Diabetes Education  Adult Diabetes Education Triage [phone]      Any diabetes medication dose changes were made via the CDE Protocol and Collaborative Practice Agreement with the patient's referring provider. A copy of this encounter was shared with the provider.

## 2022-12-06 ENCOUNTER — ANCILLARY PROCEDURE (OUTPATIENT)
Dept: ULTRASOUND IMAGING | Facility: CLINIC | Age: 42
End: 2022-12-06
Attending: OBSTETRICS & GYNECOLOGY
Payer: COMMERCIAL

## 2022-12-06 ENCOUNTER — PRENATAL OFFICE VISIT (OUTPATIENT)
Dept: OBGYN | Facility: CLINIC | Age: 42
End: 2022-12-06
Payer: COMMERCIAL

## 2022-12-06 VITALS
BODY MASS INDEX: 39.54 KG/M2 | SYSTOLIC BLOOD PRESSURE: 120 MMHG | DIASTOLIC BLOOD PRESSURE: 72 MMHG | HEART RATE: 92 BPM | WEIGHT: 216.2 LBS

## 2022-12-06 DIAGNOSIS — O24.414 INSULIN CONTROLLED GESTATIONAL DIABETES MELLITUS (GDM) DURING PREGNANCY, ANTEPARTUM: ICD-10-CM

## 2022-12-06 DIAGNOSIS — O09.522 MULTIGRAVIDA OF ADVANCED MATERNAL AGE IN SECOND TRIMESTER: Primary | ICD-10-CM

## 2022-12-06 PROCEDURE — 76819 FETAL BIOPHYS PROFIL W/O NST: CPT | Mod: TC | Performed by: RADIOLOGY

## 2022-12-06 PROCEDURE — 99207 PR PRENATAL VISIT: CPT | Performed by: OBSTETRICS & GYNECOLOGY

## 2022-12-06 NOTE — PROGRESS NOTES
Presents for routine  appointment.    BG logs not with her however has meter-   Fastings elevated >110  PP >120-150    No LOF/VB/Ctxs.  +FM  ROS:   and GI negative.     /72   Pulse 92   Wt 98.1 kg (216 lb 3.2 oz)   LMP 2022   BMI 39.54 kg/m        A/P:  41 year old  at 35w3d ARTI      Pregnancy Complications:  -GDMA2, on levemir 59QHS, novolog 5 with meals. BG not well controlled. Delivery recommended 37-39wks for poor control. Continue twice weekly BPPs. EFW 52% at 34wks. Diabetic ed appt this week.  -AMA, continue ASA  -Breech presentation. Discussed spinning babies, ECV briefly. Strongly wishes to avoid CS. Will reevaluate with BPP next week and discuss options again, plan ECV if indicated/appropriate    Routine Prenatal Care:  -Signs and symptoms of  labor discussed as well as fetal kick counts  -Group B Strep at 36+ weeks     Follow up in 1 weeks.    Erika Portillo,

## 2022-12-09 ENCOUNTER — VIRTUAL VISIT (OUTPATIENT)
Dept: EDUCATION SERVICES | Facility: CLINIC | Age: 42
End: 2022-12-09
Payer: COMMERCIAL

## 2022-12-09 ENCOUNTER — TELEPHONE (OUTPATIENT)
Dept: EDUCATION SERVICES | Facility: CLINIC | Age: 42
End: 2022-12-09

## 2022-12-09 DIAGNOSIS — O24.419 GESTATIONAL DIABETES MELLITUS: Primary | ICD-10-CM

## 2022-12-09 PROCEDURE — 99207 PR NO CHARGE NURSE ONLY: CPT | Mod: 93

## 2022-12-09 NOTE — PROGRESS NOTES
Diabetes Education Follow-up    Subjective/Objective:    Steve Pena sent in blood glucose log for review. Last date of communication was: today via phone.    Diabetes is being managed with   Lifestyle (diet/activity), Diabetes Medications   Diabetes Medication(s)     Insulin       insulin aspart (NOVOLOG FLEXPEN) 100 UNIT/ML pen    Inject 5 Units Subcutaneous 3 times daily (with meals) Take 3 units of Novolog 5-15 minutes before meals, up to 3 times per day.     insulin detemir (LEVEMIR PEN) 100 UNIT/ML pen    Give 59 units of insulin at bedtime.          BG/Food Log:   Date  Ketones FBG 1 hours post Breakfast 1 hour post lunch    1 hours post dinner   12/9  130 164     12/8  127 158 125 130   12/7  110 164 155 161   12/6  121 168 *after 2 Hrs 123 173 130   12/5   106 199 134 171   12/4  103 184 *after 2 Hrs 123 162 137   12/3  102 208 143 136   12/2  113 144 154 139     Ketones: Negative    Assessment:    Fasting blood glucose: 0% in target.  After breakfast glucose: 0% in target.  After lunch glucose: 29% in target.  After dinner glucose: 71% in target.    Plan/Response:  See Patient Instructions for co-developed, patient-stated behavior change goals.  Recommend referral to Endocrinology.  Will consult with OBGYN.  Recommend splitting Levemir dose in half and administering 30 units in the morning and 30 units before bed.  Recommend increase Novolog at meal time to 7 units (three times per day)    Jenny Lizama RDN, LD  Outpatient Diabetes Education  Adult Diabetes Education Triage [phone]      Any diabetes medication dose changes were made via the CDE Protocol and Collaborative Practice Agreement with the patient's OB/GYN provider. A copy of this encounter was shared with the provider.

## 2022-12-09 NOTE — LETTER
12/9/2022         RE: Steve Pena  [address] [facility] [address]        Dear Colleague,    Thank you for referring your patient, Steve Pena, to the [facility] DIABETES EDUCATION [place]. Please see a copy of my visit note below.    Diabetes Education Follow-up    Subjective/Objective:    Steve Pena sent in blood glucose log for review. Last date of communication was: today via phone.    Diabetes is being managed with   Lifestyle (diet/activity), Diabetes Medications   Diabetes Medication(s)     Insulin       insulin aspart (NOVOLOG FLEXPEN) 100 UNIT/ML pen    Inject 5 Units Subcutaneous 3 times daily (with meals) Take 3 units of Novolog 5-15 minutes before meals, up to 3 times per day.     insulin detemir (LEVEMIR PEN) 100 UNIT/ML pen    Give 59 units of insulin at bedtime.          BG/Food Log:   Date  Ketones FBG 1 hours post Breakfast 1 hour post lunch    1 hours post dinner   12/9  130 164     12/8  127 158 125 130   12/7  110 164 155 161   12/6  121 168 *after 2 Hrs 123 173 130   12/5   106 199 134 171   12/4  103 184 *after 2 Hrs 123 162 137   12/3  102 208 143 136   12/2  113 144 154 139     Ketones: Negative    Assessment:    Fasting blood glucose: 0% in target.  After breakfast glucose: 0% in target.  After lunch glucose: 29% in target.  After dinner glucose: 71% in target.    Plan/Response:  See Patient Instructions for co-developed, patient-stated behavior change goals.  Recommend referral to Endocrinology.  Will consult with OBGYN.    Jenny Lizama RDN, LD  Outpatient Diabetes Education  Adult Diabetes Education Triage [phone]      Any diabetes medication dose changes were made via the CDE Protocol and Collaborative Practice Agreement with the patient's OB/GYN provider. A copy of this encounter was shared with the provider.           Command hallucinations to hurt self

## 2022-12-12 ENCOUNTER — PRENATAL OFFICE VISIT (OUTPATIENT)
Dept: OBGYN | Facility: CLINIC | Age: 42
End: 2022-12-12
Payer: COMMERCIAL

## 2022-12-12 ENCOUNTER — ANCILLARY PROCEDURE (OUTPATIENT)
Dept: ULTRASOUND IMAGING | Facility: CLINIC | Age: 42
End: 2022-12-12
Attending: OBSTETRICS & GYNECOLOGY
Payer: COMMERCIAL

## 2022-12-12 VITALS
OXYGEN SATURATION: 100 % | HEART RATE: 99 BPM | DIASTOLIC BLOOD PRESSURE: 75 MMHG | SYSTOLIC BLOOD PRESSURE: 117 MMHG | BODY MASS INDEX: 39.32 KG/M2 | WEIGHT: 215 LBS

## 2022-12-12 DIAGNOSIS — O09.522 MULTIGRAVIDA OF ADVANCED MATERNAL AGE IN SECOND TRIMESTER: ICD-10-CM

## 2022-12-12 DIAGNOSIS — O24.414 INSULIN CONTROLLED GESTATIONAL DIABETES MELLITUS (GDM) DURING PREGNANCY, ANTEPARTUM: ICD-10-CM

## 2022-12-12 DIAGNOSIS — O24.414 INSULIN CONTROLLED GESTATIONAL DIABETES MELLITUS (GDM) DURING PREGNANCY, ANTEPARTUM: Primary | ICD-10-CM

## 2022-12-12 PROCEDURE — 99207 PR PRENATAL VISIT: CPT | Performed by: OBSTETRICS & GYNECOLOGY

## 2022-12-12 PROCEDURE — 87653 STREP B DNA AMP PROBE: CPT | Performed by: OBSTETRICS & GYNECOLOGY

## 2022-12-12 PROCEDURE — 76819 FETAL BIOPHYS PROFIL W/O NST: CPT | Mod: TC | Performed by: RADIOLOGY

## 2022-12-12 NOTE — PROGRESS NOTES
Patient presents for routine prenatal visit at 36w2d.  Patient without complaint. Sugars continue to be way out of range.  PE: See OB vitals  Body mass index is 39.32 kg/m .  No vaginal bleeding, LOF, contractions.  No HA, RUQ pain, N/V, visual changes.  Questions asked and answered.  BPP 8/8 Vertex  Discussed with her the concerns related to her DM.  She has not been able to get control of her sugars, despite working closely with the Diab Ed and taking quite a lot of insulin.  Given this, we would recommend MIOL at 37 weeks.  Plan [place] placement on Sunday and IOL on Monday the 19th.  RISKS, BENEFITS, AND ALTERNATIVES reviewed and she expresses understanding and agrees.    Carlos Jay MD FACOG

## 2022-12-12 NOTE — PATIENT INSTRUCTIONS
If you have any questions regarding your visit, Please contact your care team.     [employer]: [phone]    To Schedule an Appointment 24/7  Call: [phone] HOU[facility] TELEPHONE NUMBER     Carlos Jay MD  Medical Director    Leonardo Strickland-TRACIE Jack-Surgery Scheduler  Mariel-Surgery Scheduler               Tuesday-[place]  7:30 a.m-4:30 p.m    Thursday-[place]  7:30 a.m-4:30 p.m    Typical Surgery Days: Tuesday or Friday [facility] [place]  [address] [place] [address]  PH: [phone]     Imaging Scheduling all locations  PH: [phone]     [facility] Labor and Delivery  [address] [place] [address] [place] [address]  PH: [phone]    [facility]  [address] [place] [address]  PH: [phone] Fax      **Surgeries** Our Surgery Schedulers will contact you to schedule. If you do not receive a call within 3 business days, please call [phone].    Urgent Care locations:  [facility] Monday-Friday  10 am - 8 pm  Saturday and Sunday   9 am - 5 pm  Monday-Friday   10 am - 8 pm  Saturday and Sunday   9 am - 5 pm   [phone]   If you need a medication refill, please contact your pharmacy. Please allow 3 business days for your refill to be completed.  As always, Thank you for trusting us with your healthcare needs!    see additional instructions from your care team below

## 2022-12-13 LAB — GP B STREP DNA SPEC QL NAA+PROBE: NEGATIVE

## 2022-12-14 ENCOUNTER — VIRTUAL VISIT (OUTPATIENT)
Dept: ENDOCRINOLOGY | Facility: CLINIC | Age: 42
End: 2022-12-14
Payer: COMMERCIAL

## 2022-12-14 DIAGNOSIS — O24.414 INSULIN CONTROLLED GESTATIONAL DIABETES MELLITUS (GDM) DURING PREGNANCY, ANTEPARTUM: Primary | ICD-10-CM

## 2022-12-14 PROCEDURE — 99204 OFFICE O/P NEW MOD 45 MIN: CPT | Mod: 95 | Performed by: INTERNAL MEDICINE

## 2022-12-14 NOTE — PATIENT INSTRUCTIONS
Sunday evening-- Plan to take full dose levemir at 36u   Monday morning- Take 1/2 dose of Levemir 18u before leaving for hospital   - If eat, take Novolog   - If don't eat, take Novolog      For now, increase levemir 36u twice daily, increase novolog to 8u with meals    After delivery, stop all insulins.  Continue checking blood sugars once daily at rotating times.   6-12 weeks after delivery, complete 2hr oral glucose tolerance test. Order is in, you need to schedule lab appointment for this time period    I will see you back after that test is complete to interpret the results and discuss next steps.   Please let me know if you have any questions in the meantime or you see elevations in your daily blood sugar checks.     Glucose goals according to the American Diabetes Association (non-pregnancy):  --Pre-meal (including fasting, before meals):  mg/dl  --2 hours after eating: <180 mg/dl, ideally 100-150 mg/dl

## 2022-12-14 NOTE — PROGRESS NOTES
Video-Visit Details    Type of service:  Video Visit  Video Start Time: 1:07  Video End Time: 1:46  Originating Location (pt. Location): [place], [place]  Distant Location (provider location):  Home  Platform used for Video Visit: Becca Krishnan MD    Steve Pena is a 41 year old year old female here for evaluation of diabetes in pregnancy via a billable video visit.    Currently: 36w4d  Estimated Date of Delivery: Jan 7, 2023  Baby: girl      Has been following with CDE, elevated BG despite escalating doses of insulin planning for delivery induction Sunday and admission Monday.     1) Diabetes Mellitus in pregnancy    Diabetes History:  Diagnosis: Diagnosed in July with early gestational diabetes. 3 other pregnancies without gestational diabetes.   Hospitalizations:   Previous Regimens:   Current Regimen: Levemir 30u BID, Novolog 6u with meals,     6/29/2022- A1C 6.4%  7/18- 50g 1hr-  135  7/21- 100g 3hr- 101, 185, 165, 74    Last ultrasound with BPP- 11/28- measuring at 52nd percentile (34 weeks)    Diet:  24hr Recall:  Breakfast- wake at 6:15, eat at 7:15, eats oatmeal aiming for 30-45g CHO-- eats 2 slices of bread (24CHO) and 2 eggs every morning   Lunch- 1c rice w/ beans, boba wheat or brown rice  Dinner-pasta (johnson w/ chicken)  Snacks- before bed- crackers and peanut butter, banana after lunch with nuts (almonds), avocados  Beverages-  Denies hypos, none overnight, does wake feeling thirsty overnight  Lowest was 82, did not feel any symptoms, ate salad for lunch before this      Exercise:      BG check- SMBG 4x daily  Trends-   Date  Ketones FBG 1 hours post Breakfast 1 hour post lunch    1 hours post dinner   12/14  129 155     12/13  113 195 (Cream of wheat) 119 159   12/12  124 171 161 142   12/11  123 155 164 152   12/10  115 149 139 176   12/9   130 164  173 175   12/8   127 158 125 130   12/7   110 164 155 161   12/6   121 168 *after 2 Hrs 123 173 130   12/5   106 199 134 171   12/4   103 184  *after 2 Hrs 123 162 137   12/3   102 208 143 136           BP Readings from Last 3 Encounters:   12/12/22 117/75   12/06/22 120/72   11/28/22 118/78       Lab Results   Component Value Date    A1C 6.4 06/29/2022       Wt Readings from Last 3 Encounters:   12/12/22 97.5 kg (215 lb)   12/06/22 98.1 kg (216 lb 3.2 oz)   11/28/22 98.1 kg (216 lb 3.2 oz)       Current Outpatient Medications   Medication Sig Dispense Refill     ACCU-CHEK GUIDE test strip Use to test blood sugar 4x daily. 200 strip 4     acetone urine (KETOSTIX) test strip Check urine ketones when you wake up every morning for 7 days. If negative everyday, reduce testing to once a week. 50 strip 1     blood glucose monitoring (SOFTCLIX) lancets Use to test blood sugar 4 times daily. 100 each 4     Blood Glucose Monitoring Suppl (ACCU-CHEK GUIDE ME) w/Device KIT 1 each as needed (meter) 1 kit 0     insulin aspart (NOVOLOG FLEXPEN) 100 UNIT/ML pen Inject 5 Units Subcutaneous 3 times daily (with meals) Take 3 units of Novolog 5-15 minutes before meals, up to 3 times per day. 15 mL 3     insulin detemir (LEVEMIR PEN) 100 UNIT/ML pen Give 59 units of insulin at bedtime. 60 mL 3     insulin pen needle (32G X 4 MM) 32G X 4 MM miscellaneous Use 1 new pen needle with each insulin dose, 4 times per day. 110 each 3     Prenatal Vit-Fe Fumarate-FA (PNV PRENATAL PLUS MULTIVITAMIN) 27-1 MG TABS per tablet Take 1 tablet by mouth daily 100 tablet 3     Prenatal Vit-Fe Fumarate-FA (PRENATAL VITAMIN PO)             REVIEW OF SYSTEMS:   ROS: 10 point ROS neg other than the symptoms noted above in the HPI.      EXAM:  Physical Exam (visual exam)  VS:  no vital signs taken for video visit  CONSTITUTIONAL: healthy, alert and NAD, responding appropriately  ENT: normocephalic, no visual evidence of trauma, normal nose and oral mucosa  EYES: conjunctivae and sclerae normal, no exophthalmos or proptosis  THYROID:  no visualized nodules or goiter  LUNGS: no audible wheeze, cough or  "visible cyanosis, no visible retractions or increased work of breathing  EXTREMITIES: no hand tremors  NEUROLOGY: cranial nerves grossly intact with no obvious deficit.  SKIN:  no visualized skin lesions or rash, no edema visualized  PSYCH: mentation appears normal, normal judgement      ASSESSMENT/PLAN:  No diagnosis found.    1) Diabetes Mellitus in pregnancy  - Glucose Control- NOT at goal,    - Levemir 36u BID   - Novolog 8u TID   - Do not force snacks, if not hungry ok to not eat a snack (would eat banana before bed to \"help blood sugars\") Eating 3 standard meals vs 6 small meals (as recommended) so would not advocate adding snacks if not feeling hungry or hypoglycemic   - Reviewed delivery plan-- full dose levemir  PM, 1/2 dose Monday am (18u) and novolog only if eating   - Likely insulin drip at delivery   - Discharge off insulin   - Continue SMBG daily after discharge, notify me if exceeding ADA guideliens placed in after visit patient instructions   - OGTT at 6-12 weeks post partum (ordered today), will see her here afterwards  - Reviewed blood sugar goals in pregnancy. Glucose goals during pregnancy according to the American Diabetes Association:  Fasting glucose 70-95 mg/dL AND  One-hour postprandial glucose 110-140 mg/dL or  Two-hour postprandial glucose 100-120 mg/dL  - We reviewed risks of uncontrolled hyperglycemia during pregnancy, including but not limited to: gestational hypertension/preeclampsia, increased cesarian section rate, macrosomia, shoulder dystocia, still birth, and long term risk of development of diabetes in grown child--- risk of  hypoglycemia    RTC- February    A total of 45 minutes were spent today 22 on this visit including chart review, history and counseling, documentation and other activities as detailed above.           Answers for HPI/ROS submitted by the patient on 2022  General Symptoms: No  Skin Symptoms: No  HENT Symptoms: No  EYE SYMPTOMS: " No  HEART SYMPTOMS: No  LUNG SYMPTOMS: No  INTESTINAL SYMPTOMS: No  URINARY SYMPTOMS: No  GYNECOLOGIC SYMPTOMS: No  BREAST SYMPTOMS: No  SKELETAL SYMPTOMS: No  BLOOD SYMPTOMS: No  NERVOUS SYSTEM SYMPTOMS: No  MENTAL HEALTH SYMPTOMS: No

## 2022-12-14 NOTE — NURSING NOTE
Chief Complaint   Patient presents with     Diabetes       There were no vitals filed for this visit.    There is no height or weight on file to calculate BMI.    Delores White, [facility]

## 2022-12-19 ENCOUNTER — MEDICAL CORRESPONDENCE (OUTPATIENT)
Dept: OBGYN | Facility: CLINIC | Age: 42
End: 2022-12-19

## 2023-01-07 DIAGNOSIS — O24.410 DIET CONTROLLED GESTATIONAL DIABETES MELLITUS (GDM) IN SECOND TRIMESTER: ICD-10-CM

## 2023-01-09 RX ORDER — LANCETS
EACH MISCELLANEOUS
Qty: 100 EACH | Refills: 2 | Status: SHIPPED | OUTPATIENT
Start: 2023-01-09

## 2023-01-09 NOTE — CONFIDENTIAL NOTE
Routing to DE to approve or decline rx request.  It appears pt had delivered in December, 2022.    Em Soto RN

## 2023-01-09 NOTE — TELEPHONE ENCOUNTER
Sent in refill reqest for lancets. Pt was instructed to continue testing BG's after delivery.    Sariah Kelly RN, [facility]

## 2023-01-25 ASSESSMENT — PATIENT HEALTH QUESTIONNAIRE - PHQ9
SUM OF ALL RESPONSES TO PHQ QUESTIONS 1-9: 0
SUM OF ALL RESPONSES TO PHQ QUESTIONS 1-9: 0
10. IF YOU CHECKED OFF ANY PROBLEMS, HOW DIFFICULT HAVE THESE PROBLEMS MADE IT FOR YOU TO DO YOUR WORK, TAKE CARE OF THINGS AT HOME, OR GET ALONG WITH OTHER PEOPLE: NOT DIFFICULT AT ALL

## 2023-01-26 ENCOUNTER — PRENATAL OFFICE VISIT (OUTPATIENT)
Dept: OBGYN | Facility: CLINIC | Age: 43
End: 2023-01-26
Payer: COMMERCIAL

## 2023-01-26 VITALS
HEART RATE: 97 BPM | DIASTOLIC BLOOD PRESSURE: 76 MMHG | SYSTOLIC BLOOD PRESSURE: 123 MMHG | WEIGHT: 202.4 LBS | BODY MASS INDEX: 37.02 KG/M2

## 2023-01-26 DIAGNOSIS — Z86.32 HISTORY OF GESTATIONAL DIABETES MELLITUS: Primary | ICD-10-CM

## 2023-01-26 PROCEDURE — 99207 PR POST PARTUM EXAM: CPT | Performed by: OBSTETRICS & GYNECOLOGY

## 2023-01-26 NOTE — PATIENT INSTRUCTIONS
If you have any questions regarding your visit, Please contact your care team.     [employer]: [phone]    To Schedule an Appointment 24/7  Call: [phone] HOURS TELEPHONE NUMBER     Carlos Jay MD  Medical Director    Leonardo Strickland-TRACIE Jack-Surgery Scheduler  Mariel-Surgery Scheduler               Tuesday-[place]  7:30 a.m-4:30 p.m    Thursday-[place]  7:30 a.m-4:30 p.m    Typical Surgery Days: Tuesday or Friday [facility] [place]  [address] [place] [address]  PH: [phone]     Imaging Scheduling all locations  PH: [phone]     [facility] Labor and Delivery  [address] [place] [address] [place] [address]  PH: [phone]    [facility]  [address] [place] [address]  PH: [phone] Fax      **Surgeries** Our Surgery Schedulers will contact you to schedule. If you do not receive a call within 3 business days, please call [phone].    Urgent Care locations:  [facility] Monday-Friday  10 am - 8 pm  Saturday and Sunday   9 am - 5 pm  Monday-Friday   10 am - 8 pm  Saturday and Sunday   9 am - 5 pm   [phone]   If you need a medication refill, please contact your pharmacy. Please allow 3 business days for your refill to be completed.  As always, Thank you for trusting us with your healthcare needs!    see additional instructions from your care team below

## 2023-01-26 NOTE — PROGRESS NOTES
Steve is here for a postpartum checkup.    She had a   OB History    Para Term  AB Living   5 4 4 0 1 4   SAB IAB Ectopic Multiple Live Births   0 0 0 0 4      # Outcome Date GA Lbr Naeem/2nd Weight Sex Delivery Anes PTL Lv   5 Term 22 37w2d 14:35 / 00:21 3.14 kg (6 lb 14.8 oz) F Vag-Spont EPI N KEVIN      Name: AUSTYN HERRERA      Apgar1: 8  Apgar5: 9   4 Term  40w0d       KEVIN   3 Term  40w0d       KEVIN   2 Term  40w0d       KEVIN   1 AB               Since delivery, she has been breast feeding.  She has not had a normal menses.  She has not had intercourse.  Patient screened for postpartum depression and complaints are NEGATIVE. Screening has also been completed for intimate partner violence.      PE: /76 (BP Location: Right arm, Patient Position: Sitting, Cuff Size: Adult Large)   Pulse 97   Wt 91.8 kg (202 lb 6.4 oz)   LMP 2022   Breastfeeding Yes   BMI 37.02 kg/m    Body mass index is 37.02 kg/m .    General Appearance:  healthy, alert, active, no distress  A/P  Routine Postpartum    (Z86.32) History of gestational diabetes mellitus  (primary encounter diagnosis)  Comment:   Plan: Non-gestational glucose tolerance testing, 2         hour            (Z39.2) Postpartum care and examination  Comment:   Plan: Follow up as needed          Answers for HPI/ROS submitted by the patient on 2023  If you checked off any problems, how difficult have these problems made it for you to do your work, take care of things at home, or get along with other people?: Not difficult at all  PHQ9 TOTAL SCORE: 0

## 2023-02-16 ENCOUNTER — MYC MEDICAL ADVICE (OUTPATIENT)
Dept: OBGYN | Facility: CLINIC | Age: 43
End: 2023-02-16
Payer: COMMERCIAL

## 2023-02-16 DIAGNOSIS — Z30.013 ENCOUNTER FOR INITIAL PRESCRIPTION OF INJECTABLE CONTRACEPTIVE: Primary | ICD-10-CM

## 2023-02-18 ENCOUNTER — LAB (OUTPATIENT)
Dept: LAB | Facility: CLINIC | Age: 43
End: 2023-02-18
Payer: COMMERCIAL

## 2023-02-18 DIAGNOSIS — Z86.32 HISTORY OF GESTATIONAL DIABETES MELLITUS: ICD-10-CM

## 2023-02-18 PROCEDURE — 82947 ASSAY GLUCOSE BLOOD QUANT: CPT

## 2023-02-18 PROCEDURE — 36415 COLL VENOUS BLD VENIPUNCTURE: CPT

## 2023-02-18 PROCEDURE — 82950 GLUCOSE TEST: CPT

## 2023-02-20 ENCOUNTER — MEDICAL CORRESPONDENCE (OUTPATIENT)
Dept: HEALTH INFORMATION MANAGEMENT | Facility: CLINIC | Age: 43
End: 2023-02-20

## 2023-02-20 LAB
NON GESTATIONAL GTT 2 HR POST DOSE: 138 MG/DL (ref 60–199)
NON GESTATIONAL GTT FASTING: 170 MG/DL (ref 60–125)

## 2023-02-20 RX ORDER — MEDROXYPROGESTERONE ACETATE 150 MG/ML
150 INJECTION, SUSPENSION INTRAMUSCULAR
Status: ACTIVE | OUTPATIENT
Start: 2023-02-20 | End: 2024-02-15

## 2023-02-20 NOTE — TELEPHONE ENCOUNTER
I have placed the orders.  I recommend a telephone or in person visit in the next month or two to see how she is doing with this method of birth control.  I do not see that she has been on it in the past and not sure if she has been counseled on the options and what to expect.

## 2023-02-20 NOTE — TELEPHONE ENCOUNTER
Pt had a post partum visit with Dr. Jay on 1/26/23.  Pt is writing in requesting birth control.  I do not see that birth control was discussed at PP visit.    Pt is breastfeeding.  Pt would like to be on Depo Provera.    Routing to Dr. Jay and provider pool (Dr. Jay is out of office for a few days) to place a Depo Provera CAM order if appropriate.    Em Soto RN

## 2023-02-21 ENCOUNTER — VIRTUAL VISIT (OUTPATIENT)
Dept: ENDOCRINOLOGY | Facility: CLINIC | Age: 43
End: 2023-02-21
Payer: COMMERCIAL

## 2023-02-21 ENCOUNTER — ALLIED HEALTH/NURSE VISIT (OUTPATIENT)
Dept: NURSING | Facility: CLINIC | Age: 43
End: 2023-02-21
Payer: COMMERCIAL

## 2023-02-21 DIAGNOSIS — O24.414 INSULIN CONTROLLED GESTATIONAL DIABETES MELLITUS (GDM) DURING PREGNANCY, ANTEPARTUM: ICD-10-CM

## 2023-02-21 DIAGNOSIS — R73.03 PREDIABETES: Primary | ICD-10-CM

## 2023-02-21 DIAGNOSIS — Z32.00 PREGNANCY EXAMINATION OR TEST, PREGNANCY UNCONFIRMED: Primary | ICD-10-CM

## 2023-02-21 DIAGNOSIS — Z86.32 HISTORY OF GESTATIONAL DIABETES: ICD-10-CM

## 2023-02-21 LAB — HCG UR QL: NEGATIVE

## 2023-02-21 PROCEDURE — 99213 OFFICE O/P EST LOW 20 MIN: CPT | Mod: VID | Performed by: INTERNAL MEDICINE

## 2023-02-21 PROCEDURE — 96372 THER/PROPH/DIAG INJ SC/IM: CPT | Performed by: OBSTETRICS & GYNECOLOGY

## 2023-02-21 PROCEDURE — 99207 PR NO CHARGE LOS: CPT

## 2023-02-21 PROCEDURE — 81025 URINE PREGNANCY TEST: CPT

## 2023-02-21 RX ADMIN — MEDROXYPROGESTERONE ACETATE 150 MG: 150 INJECTION, SUSPENSION INTRAMUSCULAR at 11:29

## 2023-02-21 NOTE — PROGRESS NOTES
Phone-Visit Details    Type of service:  Phone Visit  Video Start Time: 12:04  Video End Time:12:20  Originating Location (pt. Location): [place], [place]  Distant Location (provider location):  Home  Platform used for Video Visit: Bridget Krishnan MD    Steve Pena is a 42 year old year old female here for follow-up of post partum gestational diabetes via a billable phone visit.      INTERVAL HISTORY:  Delivered baby 12/14, doing well  Has been pumping but not direct feeding and fading, stopped as of yesterday  Checking BG daily-- range from fasting in AM-- <130 almost always, aprox 105-115  OGTT completed last week          1) Diabetes Mellitus in pregnancy    Diabetes History:  Diagnosis: Diagnosed in July 2022 with early gestational diabetes. 3 other pregnancies without gestational diabetes. Baby born and had hypoglycemia, 2 day NICU stay   Hospitalizations: none  Previous Regimens: Levemir 30u BID, Novolog 6u with meals,   Current Regimen: none    6/29/2022- A1C 6.4%     Fasting <130 almost always, aprox 105-115  Highest 2hr post prandial is about 140    OGTT 2/18/2023  Fasting- 170-- (however patient noted this was drawn 1hr after drank glucose solution, did not have baseline level)  2hr- 138    Still doing exercise since delivery, treadmill, walks for a while 2-3 days a week (~300 calories)  Monitoring         BP Readings from Last 3 Encounters:   01/26/23 123/76   12/12/22 117/75   12/06/22 120/72       Lab Results   Component Value Date    A1C 6.4 06/29/2022       Wt Readings from Last 3 Encounters:   01/26/23 91.8 kg (202 lb 6.4 oz)   12/12/22 97.5 kg (215 lb)   12/06/22 98.1 kg (216 lb 3.2 oz)       Current Outpatient Medications   Medication Sig Dispense Refill     ACCU-CHEK GUIDE test strip Use to test blood sugar 4x daily. 200 strip 4     blood glucose monitoring (SOFTCLIX) lancets USE TO TEST BLOOD SUGAR 4 TIMES DAILY 100 each 2     Prenatal Vit-Fe Fumarate-FA (PNV PRENATAL PLUS MULTIVITAMIN)  "27-1 MG TABS per tablet Take 1 tablet by mouth daily 100 tablet 3          REVIEW OF SYSTEMS:   ROS: 10 point ROS neg other than the symptoms noted above in the HPI.      EXAM:  Not completed for this phone visit    ASSESSMENT/PLAN:  No diagnosis found.    1) Diabetes Mellitus post-partum  - Glucose Control- at goal based on fingerstick   - per patient report, appears OGTT was performed incorrectly (fasting label was actually drawn 1hr post consumption)   - Discussed today patient has a 50-60% lifetime risk of progression to diabetes given history of gestational diabetes. I have recommended continued intensive lifestyle modifications including continuing \"gestational diabetes diet\" with minimal processed foods, whole foods including whole grains, fruits, veggies, lean meats. 20-30 min exercise daily. These have been shown to be 50% effective in reducing progression to diabetes. An alternative is metformin, which was shown to reduce progression to diabetes by 30%.   She is no longer breastfeeding  I have recommended she have A1C completed in two monts and then annually , either with me or PCP. She can then continue screening annually after that.    Orders Placed This Encounter   Procedures     Hemoglobin A1c       RTC- PRN    A total of 24 minutes were spent today 02/21/23 on this visit including chart review, history and counseling, documentation and other activities as detailed above.     Answers for HPI/ROS submitted by the patient on 2/17/2023  General Symptoms: No  Skin Symptoms: No  HENT Symptoms: No  EYE SYMPTOMS: No  HEART SYMPTOMS: No  LUNG SYMPTOMS: No  INTESTINAL SYMPTOMS: No  URINARY SYMPTOMS: No  GYNECOLOGIC SYMPTOMS: No  BREAST SYMPTOMS: No  SKELETAL SYMPTOMS: No  BLOOD SYMPTOMS: No  NERVOUS SYSTEM SYMPTOMS: No  MENTAL HEALTH SYMPTOMS: No      "

## 2023-02-21 NOTE — PROGRESS NOTES
Clinic Administered Medication Documentation    Administrations This Visit     medroxyPROGESTERone (DEPO-PROVERA) injection 150 mg     Admin Date  02/21/2023 Action  Given Dose  150 mg Route  Intramuscular Site  Left Deltoid Administered By  Shira Montesinos CMA    Ordering Provider: Cee Lino MD    NDC: 66140-727-16    Lot#: [id number]    : [facility]    Patient Supplied?: No                  Depo Provera Documentation    URINE HCG: negative    Depo-Provera Standing Order inclusion/exclusion criteria reviewed.   Patient meets: inclusion criteria     BP: Data Unavailable  LAST PAP/EXAM: No results found for: PAP    Prior to injection, verified patient identity using patient's name and date of birth. Medication was administered. Please see MAR and medication order for additional information.     Was entire vial of medication used? Yes  Vial/Syringe: Single dose vial  Expiration Date:  04/2024    Patient instructed to remain in clinic for 15 minutes and report any adverse reaction to staff immediately .  NEXT INJECTION DUE: 5/9/23 - 5/23/23

## 2023-02-21 NOTE — LETTER
2/21/2023         RE: Steve Pena  [address] [address]        Dear Colleague,    Thank you for referring your patient, Steve Pena, to the [facility] [place]. Please see a copy of my visit note below.      Phone-Visit Details    Type of service:  Phone Visit  Video Start Time: 12:04  Video End Time:12:20  Originating Location (pt. Location): [place], [place]  Distant Location (provider location):  Home  Platform used for Video Visit: Bridget Krishnan MD    Steve Pena is a 42 year old year old female here for follow-up of post partum gestational diabetes via a billable phone visit.      INTERVAL HISTORY:  Delivered baby 12/14, doing well  Has been pumping but not direct feeding and fading, stopped as of yesterday  Checking BG daily-- range from fasting in AM-- <130 almost always, aprox 105-115  OGTT completed last week          1) Diabetes Mellitus in pregnancy    Diabetes History:  Diagnosis: Diagnosed in July 2022 with early gestational diabetes. 3 other pregnancies without gestational diabetes. Baby born and had hypoglycemia, 2 day NICU stay   Hospitalizations: none  Previous Regimens: Levemir 30u BID, Novolog 6u with meals,   Current Regimen: none    6/29/2022- A1C 6.4%     Fasting <130 almost always, aprox 105-115  Highest 2hr post prandial is about 140    OGTT 2/18/2023  Fasting- 170-- (however patient noted this was drawn 1hr after drank glucose solution, did not have baseline level)  2hr- 138    Still doing exercise since delivery, treadmill, walks for a while 2-3 days a week (~300 calories)  Monitoring         BP Readings from Last 3 Encounters:   01/26/23 123/76   12/12/22 117/75   12/06/22 120/72       Lab Results   Component Value Date    A1C 6.4 06/29/2022       Wt Readings from Last 3 Encounters:   01/26/23 91.8 kg (202 lb 6.4 oz)   12/12/22 97.5 kg (215 lb)   12/06/22 98.1 kg (216 lb 3.2 oz)       Current Outpatient Medications   Medication Sig  "Dispense Refill     ACCU-CHEK GUIDE test strip Use to test blood sugar 4x daily. 200 strip 4     blood glucose monitoring (SOFTCLIX) lancets USE TO TEST BLOOD SUGAR 4 TIMES DAILY 100 each 2     Prenatal Vit-Fe Fumarate-FA (PNV PRENATAL PLUS MULTIVITAMIN) 27-1 MG TABS per tablet Take 1 tablet by mouth daily 100 tablet 3          REVIEW OF SYSTEMS:   ROS: 10 point ROS neg other than the symptoms noted above in the HPI.      EXAM:  Not completed for this phone visit    ASSESSMENT/PLAN:  No diagnosis found.    1) Diabetes Mellitus post-partum  - Glucose Control- at goal based on fingerstick   - per patient report, appears OGTT was performed incorrectly (fasting label was actually drawn 1hr post consumption)   - Discussed today patient has a 50-60% lifetime risk of progression to diabetes given history of gestational diabetes. I have recommended continued intensive lifestyle modifications including continuing \"gestational diabetes diet\" with minimal processed foods, whole foods including whole grains, fruits, veggies, lean meats. 20-30 min exercise daily. These have been shown to be 50% effective in reducing progression to diabetes. An alternative is metformin, which was shown to reduce progression to diabetes by 30%.   She is no longer breastfeeding  I have recommended she have A1C completed in two monts and then annually , either with me or PCP. She can then continue screening annually after that.    Orders Placed This Encounter   Procedures     Hemoglobin A1c       RTC- PRN    A total of 24 minutes were spent today 02/21/23 on this visit including chart review, history and counseling, documentation and other activities as detailed above.     Answers for HPI/ROS submitted by the patient on 2/17/2023  General Symptoms: No  Skin Symptoms: No  HENT Symptoms: No  EYE SYMPTOMS: No  HEART SYMPTOMS: No  LUNG SYMPTOMS: No  INTESTINAL SYMPTOMS: No  URINARY SYMPTOMS: No  GYNECOLOGIC SYMPTOMS: No  BREAST SYMPTOMS: No  SKELETAL " SYMPTOMS: No  BLOOD SYMPTOMS: No  NERVOUS SYSTEM SYMPTOMS: No  MENTAL HEALTH SYMPTOMS: No          Again, thank you for allowing me to participate in the care of your patient.        Sincerely,        Shae Krishnan MD

## 2023-04-04 ENCOUNTER — LAB (OUTPATIENT)
Dept: LAB | Facility: CLINIC | Age: 43
End: 2023-04-04
Payer: COMMERCIAL

## 2023-04-04 DIAGNOSIS — Z86.32 HISTORY OF GESTATIONAL DIABETES: ICD-10-CM

## 2023-04-04 DIAGNOSIS — R73.03 PREDIABETES: ICD-10-CM

## 2023-04-04 LAB — HBA1C MFR BLD: 6.8 % (ref 0–5.6)

## 2023-04-04 PROCEDURE — 83036 HEMOGLOBIN GLYCOSYLATED A1C: CPT

## 2023-04-04 PROCEDURE — 36415 COLL VENOUS BLD VENIPUNCTURE: CPT

## 2023-04-10 DIAGNOSIS — E11.9 TYPE 2 DIABETES MELLITUS WITHOUT COMPLICATION, WITHOUT LONG-TERM CURRENT USE OF INSULIN (H): Primary | ICD-10-CM

## 2023-04-13 DIAGNOSIS — E11.9 TYPE 2 DIABETES MELLITUS WITHOUT COMPLICATION, WITHOUT LONG-TERM CURRENT USE OF INSULIN (H): Primary | ICD-10-CM

## 2023-04-19 ENCOUNTER — OFFICE VISIT (OUTPATIENT)
Dept: FAMILY MEDICINE | Facility: CLINIC | Age: 43
End: 2023-04-19
Payer: COMMERCIAL

## 2023-04-19 ENCOUNTER — ANCILLARY PROCEDURE (OUTPATIENT)
Dept: GENERAL RADIOLOGY | Facility: CLINIC | Age: 43
End: 2023-04-19
Attending: PHYSICIAN ASSISTANT
Payer: COMMERCIAL

## 2023-04-19 ENCOUNTER — MEDICAL CORRESPONDENCE (OUTPATIENT)
Dept: HEALTH INFORMATION MANAGEMENT | Facility: CLINIC | Age: 43
End: 2023-04-19

## 2023-04-19 VITALS
RESPIRATION RATE: 16 BRPM | HEIGHT: 62 IN | DIASTOLIC BLOOD PRESSURE: 69 MMHG | WEIGHT: 211 LBS | OXYGEN SATURATION: 98 % | HEART RATE: 98 BPM | BODY MASS INDEX: 38.83 KG/M2 | TEMPERATURE: 97.8 F | SYSTOLIC BLOOD PRESSURE: 112 MMHG

## 2023-04-19 DIAGNOSIS — M79.644 FINGER PAIN, RIGHT: ICD-10-CM

## 2023-04-19 DIAGNOSIS — R29.898 TMJ CLICK: ICD-10-CM

## 2023-04-19 DIAGNOSIS — M79.644 FINGER PAIN, RIGHT: Primary | ICD-10-CM

## 2023-04-19 PROCEDURE — 73130 X-RAY EXAM OF HAND: CPT | Mod: TC | Performed by: RADIOLOGY

## 2023-04-19 PROCEDURE — 99203 OFFICE O/P NEW LOW 30 MIN: CPT | Performed by: PHYSICIAN ASSISTANT

## 2023-04-19 ASSESSMENT — PAIN SCALES - GENERAL: PAINLEVEL: EXTREME PAIN (8)

## 2023-04-19 NOTE — PROGRESS NOTES
Assessment & Plan     (M79.033) Finger pain, right  (primary encounter diagnosis)  Comment: Patient presented for evaluation of right finger pain.  Had prior orthopedic injection at the site.  No prior x-ray.  Tenderness over the MCP.  X-ray was obtained does not show any evidence of arthritis at this joint.  Discussed the patient following back up with orthopedics for further evaluation.  Patient was understanding.  Plan: XR Hand Right G/E 3 Views, Orthopedic         Referral          (R21.459) TMJ click  Comment: Ongoing right TMJ discomfort.  There is clicking at the site.  I suspect the patient may be grinding teeth.  Discussed potential of mouthguard as well as following with dentist.  Discussed using ibuprofen and Tylenol as needed pain control.  Return with any worsening or new concerns.  Farhat Sheppard PA-C  [facility]   Steve is a 42 year old, presenting for the following health issues:  Hand Pain (Right hand pain,) and Finger         View : No data to display.              Hand Pain    History of Present Illness       Reason for visit:  Pain in my finger    She eats 2-3 servings of fruits and vegetables daily.She consumes 0 sweetened beverage(s) daily.She exercises with enough effort to increase her heart rate 30 to 60 minutes per day.  She exercises with enough effort to increase her heart rate 5 days per week.   She is taking medications regularly.     Patient with history of right fourth finger pain and irritation.  She has had locking and inability to straighten the finger at times.  Had followed with orthopedics previously and had a steroid injection for this.  Denies any improvement with that.  Patient reports discomfort over the MCP joint.  No pain in the wrist.  Patient is right-hand dominant.    Further, patient has been having some clicking and pain at the right jaw.  This discomfort radiates towards the ear.  No trauma or injury.  Patient is  "unsure if she is grinding her teeth.  No dental pain.       Review of Systems   Constitutional, HEENT, cardiovascular, pulmonary, gi and gu systems are negative, except as otherwise noted.      Objective    /69   Pulse 98   Temp 97.8  F (36.6  C) (Tympanic)   Resp 16   Ht 1.575 m (5' 2\")   Wt 95.7 kg (211 lb)   SpO2 98%   BMI 38.59 kg/m    Body mass index is 38.59 kg/m .  Physical Exam   GENERAL: healthy, alert and no distress  HENT: normal cephalic/atraumatic, nose and mouth without ulcers or lesions, oropharynx clear, oral mucous membranes moist and discomfort over the right TMJ distribution with popping and clicking at that site  NECK: no adenopathy, no asymmetry, masses, or scars and thyroid normal to palpation  RESP: Normal respiratory effort without any respiratory distress.  MS: Patient's right fourth digit does become locked in place when fully flex.  Requires manual straightening of this.  Tenderness over the MCP.  Able to flex and extend at the DIP and IP joints without difficulty.  Capillary refill less than 2 seconds.  Radial pulse 2+.             "

## 2023-04-20 ENCOUNTER — OFFICE VISIT (OUTPATIENT)
Dept: OBGYN | Facility: CLINIC | Age: 43
End: 2023-04-20
Payer: COMMERCIAL

## 2023-04-20 VITALS
HEART RATE: 93 BPM | BODY MASS INDEX: 38.7 KG/M2 | OXYGEN SATURATION: 99 % | WEIGHT: 211.6 LBS | SYSTOLIC BLOOD PRESSURE: 127 MMHG | DIASTOLIC BLOOD PRESSURE: 77 MMHG

## 2023-04-20 DIAGNOSIS — Z30.016 ENCOUNTER FOR INITIAL PRESCRIPTION OF TRANSDERMAL PATCH HORMONAL CONTRACEPTIVE DEVICE: Primary | ICD-10-CM

## 2023-04-20 PROCEDURE — 99214 OFFICE O/P EST MOD 30 MIN: CPT | Performed by: OBSTETRICS & GYNECOLOGY

## 2023-04-20 RX ORDER — NORELGESTROMIN AND ETHINYL ESTRADIOL 35; 150 UG/MG; UG/MG
PATCH TRANSDERMAL
Qty: 3 PATCH | Refills: 11 | Status: SHIPPED | OUTPATIENT
Start: 2023-04-20

## 2023-04-20 NOTE — PROGRESS NOTES
Steve is a 42 year old   here to discuss birth control change.  She has been on depo Provera, but feels she has gained weight and wants something different..  ROS: No urinary frequency or dysuria, bladder or kidney problems  ROS: Ten point review of systems was reviewed and negative except the above.    PMH: Her past medical, surgical, and obstetric histories were reviewed and are documented in their appropriate chart areas.    ALL/Meds: Her medication and allergy histories were reviewed and are documented in their appropriate chart areas.    SH/FMH: Reviewed and documented in the appropriate area.    PE: /77 (BP Location: Left arm, Patient Position: Sitting, Cuff Size: Adult Regular)   Pulse 93   Wt 96 kg (211 lb 9.6 oz)   LMP 2023 (Approximate)   SpO2 99%   BMI 38.70 kg/m      Reviewed the risks, benefits , alternatives and side effects of birth control options including abstinence, oral contraceptives, transdermal patch, transvaginal ring, Depo-Provera, IUD, hormonal implants, condoms, diaphrams,and permanent sterilization.  Reviewed need for back-up contraception for the first month of hormonal methods.  Reviewed that only abstinence and condoms provide protection from STD's.  Patient desires the patch for birth control..    A/P:   Steve presents with (Z30.016) Encounter for initial prescription of transdermal patch hormonal contraceptive device  (primary encounter diagnosis)  Comment: 30 minutes spent on the date of the encounter doing chart review, patient visit and documentation   Plan: norelgestromin-ethinyl estradiol (ORTHO EVRA)         150-35 MCG/24HR patch                -    No orders of the defined types were placed in this encounter.        Carlos Jay MD FACOG

## 2023-04-20 NOTE — PATIENT INSTRUCTIONS
If you have any questions regarding your visit, Please contact your care team.     [facility]: [phone]    To Schedule an Appointment 24/7  Call: [phone]LE[facility]ONE [facility]     Carlos Jay MD  Medical Director    Leonardo Strickland-TRACIE Jack-Surgery Scheduler  Mariel-Surgery Scheduler               Tuesday-[place]  7:30 a.m-4:30 p.m    Thursday-[place]  7:30 a.m-4:30 p.m    Typical Surgery Days: Tuesday or Friday [facility] [place]  [address] [place] [address]  PH: [phone]     Imaging Scheduling all locations  PH: [phone]     [facility]  [address] [place] [address] [place] [address]  PH: [phone]    [facility]  [address] [place] [address]  PH: [phone] Fax      **Surgeries** Our Surgery Schedulers will contact you to schedule. If you do not receive a call within 3 business days, please call [phone].    Urgent Care locations:  [facility] Monday-Friday  10 am - 8 pm  Saturday and Sunday   9 am - 5 pm  Monday-Friday   10 am - 8 pm  Saturday and Sunday   9 am - 5 pm   [phone]   If you need a medication refill, please contact your pharmacy. Please allow 3 business days for your refill to be completed.  As always, Thank you for trusting us with your healthcare needs!    see additional instructions from your care team below

## 2023-04-21 DIAGNOSIS — E11.9 TYPE 2 DIABETES MELLITUS WITHOUT COMPLICATION, WITHOUT LONG-TERM CURRENT USE OF INSULIN (H): Primary | ICD-10-CM

## 2023-05-09 ENCOUNTER — VIRTUAL VISIT (OUTPATIENT)
Dept: EDUCATION SERVICES | Facility: CLINIC | Age: 43
End: 2023-05-09
Attending: INTERNAL MEDICINE
Payer: COMMERCIAL

## 2023-05-09 DIAGNOSIS — E11.9 TYPE 2 DIABETES MELLITUS WITHOUT COMPLICATION, WITHOUT LONG-TERM CURRENT USE OF INSULIN (H): ICD-10-CM

## 2023-05-09 PROCEDURE — G0108 DIAB MANAGE TRN  PER INDIV: HCPCS | Mod: VID | Performed by: DIETITIAN, REGISTERED

## 2023-05-09 NOTE — PATIENT INSTRUCTIONS
MY DIABETES TODAY:    1)  Goal A1C is under <7.0  Mine is:      Lab Results   Component Value Date    A1C 6.8 04/04/2023       2)  Goal LDL (bad cholesterol) under 100  (measured at least yearly)- I am currently at: No results found for: LDL    3)  Goal blood pressure under 130/80- mine was Data Unavailable today    Care Plan:  Advised checking glucose at home 3x/week.  Encouraged carb controlled diet with 30-45gm per meal, 0-15gm per snack.  Encouraged weight loss as able.  Continue exercise of 20-30+ minutes daily.    Follow up:  Call [phone]), e-mail ([email or web address]), or send [email or web address] message with questions, concerns or if follow-up is needed.     Bring blood glucose meter and logbook with you to all doctor and follow-up appointments.     [facility] for the [facility]:  For Your Diabetes or Nutrition Education Appointments Call:  [phone]   For Diabetes or Nutrition Related Questions:   [phone]  Send MyChart Message   If you need a medication refill please contact your pharmacy. Please allow 3 business days for your refills to be completed.

## 2023-05-09 NOTE — LETTER
"    5/9/2023         RE: Steve Pena  [address] [address]        Dear Colleague,    Thank you for referring your patient, Steve Pena, to the [facility]. Please see a copy of my visit note below.    Diabetes Self-Management Education & Support    Presents for: Initial Assessment for new diagnosis    Type of service:  Video Visit    If the video visit is dropped, the video visit invitation should be resent by: Text to cell phone: [phone]    Originating Location (pt. Location): Home  Distant Location (provider location): Offsite  Mode of Communication:  Video Conference via MEDEM    Video Start Time: 10:00  Video End Time (time video stopped): 10:30    How would patient like to obtain AVS? MyChart      Assessment Type:   ASSESSMENT:  Patient presents for education for a new diagnosis of Type 2 diabetes.  Patient states she was unaware of this.    Reports her post-partum blood sugars are all \"normal\" in the range of 100-130.  She will have another A1c drawn in June.  No diabetes medications at this time.  Following a carb controlled diet.  Trying to be active for 20+ minutes daily.    Patient's most recent   Lab Results   Component Value Date    A1C 6.8 04/04/2023     is meeting goal of <7.0    Diabetes knowledge and skills assessment:   Patient is knowledgeable in diabetes management concepts related to: Healthy Eating, Being Active and Monitoring    Continue education with the following diabetes management concepts: Problem Solving, Reducing Risks and Healthy Coping    Based on learning assessment above, most appropriate setting for further diabetes education would be: Individual setting.      PLAN    Discussed lab work and diagnostic criteria for Type 2 diabetes.  Also discussed normal fasting glucose is <100.  Advised checking glucose at home 3x/week.  Encouraged carb controlled diet with 30-45gm per meal, 0-15gm per snack.  Encouraged weight loss as " "able.  Continue exercise of 20-30+ minutes daily.    Topics to cover at upcoming visits: Problem Solving    Follow-up: per patient    See Care Plan for co-developed, patient-state behavior change goals.  AVS provided for patient today.    Education Materials Provided:  No new materials provided today      SUBJECTIVE/OBJECTIVE:  Presents for: Initial Assessment for new diagnosis  Accompanied by: Self  Diabetes education in the past 24mo: Yes  Focus of Visit: Patient Unsure  Diabetes type: Type 2  Other concerns:: None  Cultural Influences/Ethnic Background:  Not  or       Diabetes Symptoms & Complications:     Complications assessed today?: No    Patient Problem List and Family Medical History reviewed for relevant medical history, current medical status, and diabetes risk factors.    Vitals:  LMP 03/21/2023 (Approximate)   Estimated body mass index is 38.7 kg/m  as calculated from the following:    Height as of 4/19/23: 1.575 m (5' 2\").    Weight as of 4/20/23: 96 kg (211 lb 9.6 oz).   Last 3 BP:   BP Readings from Last 3 Encounters:   04/20/23 127/77   04/19/23 112/69   01/26/23 123/76       History   Smoking Status     Never   Smokeless Tobacco     Never       Labs:  Lab Results   Component Value Date    A1C 6.8 04/04/2023     No results found for: GLC  No results found for: LDL  No results found for: HDL]  No results found for: GFRESTIMATED  No results found for: GFRESTBLACK  No results found for: CR  No results found for: MICROALBUMIN    Healthy Eating:  Healthy Eating Assessed Today: Yes  Cultural/Rastafarian diet restrictions?: No  Meals include: Breakfast, Lunch, Dinner  Breakfast: 2 eggs, 2 wheat toast, water/coffee  Lunch: <1 cup brown rice or bulgar or beans or potatoes, chicken, vegetables (broccoli)  Dinner: Watermelon + yogurt OR cereal with milk + vegetables OR PB sandwich  Snacks: fruit, nuts  Beverages: Tea, Water  Has patient met with a dietitian in the past?: Yes    Being Active:  Being " Active Assessed Today: Yes  Exercise:: Yes (has not been exercising)  Days per week of moderate to strenuous exercise (like a brisk walk): 4  On average, minutes per day of exercise at this level: 30  How intense was your typical exercise? : Light (like stretching or slow walking)  Exercise Minutes per Week: 120  Barrier to exercise: None    Monitoring:  Monitoring Assessed Today: Yes  Did patient bring glucose meter to appointment? : Yes  Times checking blood sugar at home (number): 1  Times checking blood sugar at home (per): Day        Taking Medications:      Taking Medication Assessed Today: Yes  Current Treatments: None, Diet    Problem Solving:  Problem Solving Assessed Today: Yes  Is the patient at risk for hypoglycemia?: No  Is the patient at risk for DKA?: No              Reducing Risks:  Reducing Risks Assessed Today: Yes  Diabetes Risks: History of gestational diabetes, Ethnicity  Additional female risks: Gestational Diabetes    Healthy Coping:  Healthy Coping Assessed Today: Yes  Emotional response to diabetes: Ready to learn  Informal Support system:: Family  Stage of change: ACTION (Actively working towards change)  Support resources: None  Patient Activation Measure Survey Score:       View : No data to display.                  Care Plan and Education Provided:  Care Plan: Diabetes   Updates made by Belinda Bennett RD since 5/9/2023 12:00 AM      Problem: HbA1C Not In Goal       Goal: Establish Regular Follow-Ups with PCP       Task: Discuss with PCP the recommended timing for patient's next follow up visit(s)    Responsible User: Belinda Bennett RD      Task: Discuss schedule for PCP visits with patient    Responsible User: Belinda Bennett RD      Goal: Get HbA1C Level in Goal       Task: Educate patient on diabetes education self-management topics Completed 5/9/2023   Responsible User: Belinda Bennett RD      Task: Educate patient on benefits of regular glucose monitoring     Responsible User: Belinda Bennett RD      Task: Refer patient to appropriate extended care team member, as needed (Medication Therapy Management, Behavioral Health, Physical Therapy, etc.)    Responsible User: Belinda Bennett RD      Task: Discuss diabetes treatment plan with patient Completed 5/9/2023   Responsible User: Belinda Bennett RD      Problem: Diabetes Self-Management Education Needed to Optimize Self-Care Behaviors       Goal: Understand diabetes pathophysiology and disease progression       Task: Provide education on diabetes pathophysiology and disease progression specfic to patient's diabetes type Completed 5/9/2023   Responsible User: Belinda Bennett RD      Goal: Healthy Eating - follow a healthy eating pattern for diabetes       Task: Provide education on portion control and consistency in amount, composition and timing of food intake Completed 5/9/2023   Responsible User: Belinda Bennett RD      Task: Provide education on managing carbohydrate intake (carbohydrate counting, plate planning method, etc.)    Responsible User: Belinda Bennett RD      Task: Provide education on weight management    Responsible User: Belinda Bennett RD      Task: Provide education on heart healthy eating    Responsible User: Belinda Bennett RD      Task: Provide education on eating out    Responsible User: Belinda Bennett RD      Task: Develop individualized healthy eating plan with patient Completed 5/9/2023   Responsible User: Belinda Bennett RD      Goal: Being Active - get regular physical activity, working up to at least 150 minutes per week       Task: Provide education on relationship of activity to glucose and precautions to take if at risk for low glucose Completed 5/9/2023   Responsible User: Belinda Bennett RD      Task: Discuss barriers to physical activity with patient    Responsible User: Belinda Bennett RD      Task: Develop physical activity plan with patient     Responsible User: Belinda Bennett RD      Task: Explore community resources including walking groups, assistance programs, and home videos    Responsible User: Belinda Bennett RD      Goal: Monitoring - monitor glucose and ketones as directed    This Visit's Progress: 50%   Note:    Test fasting blood sugar 3-4x/week     Task: Provide education on blood glucose monitoring (purpose, proper technique, frequency, glucose targets, interpreting results, when to use glucose control solution, sharps disposal) Completed 5/9/2023   Responsible User: Belinda Bennett RD      Task: Provide education on continuous glucose monitoring (sensor placement, use of sabrina or /reader, understanding glucose trends, alerts and alarms, differences between sensor glucose and blood glucose)    Responsible User: Belinda Bennett RD      Task: Provide education on ketone monitoring (when to monitor, frequency, etc.)    Responsible User: Belinda Bennett RD      Goal: Taking Medication - patient is consistently taking medications as directed       Task: Provide education on action of prescribed medication, including when to take and possible side effects    Responsible User: Belinda Bennett RD      Task: Provide education on insulin and injectable diabetes medications, including administration, storage, site selection and rotation for injection sites    Responsible User: Belinda Bennett RD      Task: Discuss barriers to medication adherence with patient and provide management technique ideas as appropriate    Responsible User: Belinda Bennett RD      Task: Provide education on frequency and refill details of medications    Responsible User: Belinda Bennett RD      Goal: Problem Solving - know how to prevent and manage short-term diabetes complications       Task: Provide education on high blood glucose - causes, signs/symptoms, prevention and treatment    Responsible User: Belinda Bennett RD      Task: Provide  education on low blood glucose - causes, signs/symptoms, prevention, treatment, carrying a carbohydrate source at all times, and medical identification    Responsible User: Belinda Bennett RD      Task: Provide education on safe travel with diabetes    Responsible User: Belinda Bennett RD      Task: Provide education on how to care for diabetes on sick days    Responsible User: Belinda Bennett RD      Task: Provide education on when to call a health care provider    Responsible User: Belinda Bennett RD      Goal: Reducing Risks - know how to prevent and treat long-term diabetes complications       Task: Provide education on major complications of diabetes, prevention, early diagnostic measures and treatment of complications    Responsible User: Belinda Bennett RD      Task: Provide education on recommended care for dental, eye and foot health    Responsible User: Belinda Bennett RD      Task: Provide education on Hemoglobin A1c - goals and relationship to blood glucose levels Completed 5/9/2023   Responsible User: Belinda Bennett RD      Task: Provide education on recommendations for heart health - lipid levels and goals, blood pressure and goals, and aspirin therapy, if indicated    Responsible User: Belinda Bennett RD      Task: Provide education on tobacco cessation    Responsible User: Belinda Bennett RD      Goal: Healthy Coping - use available resources to cope with the challenges of managing diabetes       Task: Discuss recognizing feelings about having diabetes    Responsible User: Belinda Bennett RD      Task: Provide education on the benefits of making appropriate lifestyle changes Completed 5/9/2023   Responsible User: Belinda Bennett RD      Task: Provide education on benefits of utilizing support systems    Responsible User: Belinda Bennett RD      Task: Discuss methods for coping with stress    Responsible User: Belinda Bennett RD      Task: Provide education on  when to seek professional counseling    Responsible User: Belinda Bennett RD Tami Carpenter, RDN LD CDCES      Time Spent: 30 minutes  Encounter Type: Individual    Any diabetes medication dose changes were made via the CDE Protocol per the patient's referring provider. A copy of this encounter was shared with the provider.

## 2023-05-09 NOTE — PROGRESS NOTES
"Diabetes Self-Management Education & Support    Presents for: Initial Assessment for new diagnosis    Type of service:  Video Visit    If the video visit is dropped, the video visit invitation should be resent by: Text to cell phone: [phone]    Originating Location (pt. Location): Home  Distant Location (provider location): Offsite  Mode of Communication:  Video Conference via Managed Objects    Video Start Time: 10:00  Video End Time (time video stopped): 10:30    How would patient like to obtain AVS? MyChart      Assessment Type:   ASSESSMENT:  Patient presents for education for a new diagnosis of Type 2 diabetes.  Patient states she was unaware of this.    Reports her post-partum blood sugars are all \"normal\" in the range of 100-130.  She will have another A1c drawn in June.  No diabetes medications at this time.  Following a carb controlled diet.  Trying to be active for 20+ minutes daily.    Patient's most recent   Lab Results   Component Value Date    A1C 6.8 04/04/2023     is meeting goal of <7.0    Diabetes knowledge and skills assessment:   Patient is knowledgeable in diabetes management concepts related to: Healthy Eating, Being Active and Monitoring    Continue education with the following diabetes management concepts: Problem Solving, Reducing Risks and Healthy Coping    Based on learning assessment above, most appropriate setting for further diabetes education would be: Individual setting.      PLAN    Discussed lab work and diagnostic criteria for Type 2 diabetes.  Also discussed normal fasting glucose is <100.  Advised checking glucose at home 3x/week.  Encouraged carb controlled diet with 30-45gm per meal, 0-15gm per snack.  Encouraged weight loss as able.  Continue exercise of 20-30+ minutes daily.    Topics to cover at upcoming visits: Problem Solving    Follow-up: per patient    See Care Plan for co-developed, patient-state behavior change goals.  AVS provided for patient today.    Education " "Materials Provided:  No new materials provided today      SUBJECTIVE/OBJECTIVE:  Presents for: Initial Assessment for new diagnosis  Accompanied by: Self  Diabetes education in the past 24mo: Yes  Focus of Visit: Patient Unsure  Diabetes type: Type 2  Other concerns:: None  Cultural Influences/Ethnic Background:  Not  or       Diabetes Symptoms & Complications:     Complications assessed today?: No    Patient Problem List and Family Medical History reviewed for relevant medical history, current medical status, and diabetes risk factors.    Vitals:  LMP 03/21/2023 (Approximate)   Estimated body mass index is 38.7 kg/m  as calculated from the following:    Height as of 4/19/23: 1.575 m (5' 2\").    Weight as of 4/20/23: 96 kg (211 lb 9.6 oz).   Last 3 BP:   BP Readings from Last 3 Encounters:   04/20/23 127/77   04/19/23 112/69   01/26/23 123/76       History   Smoking Status     Never   Smokeless Tobacco     Never       Labs:  Lab Results   Component Value Date    A1C 6.8 04/04/2023     No results found for: GLC  No results found for: LDL  No results found for: HDL]  No results found for: GFRESTIMATED  No results found for: GFRESTBLACK  No results found for: CR  No results found for: MICROALBUMIN    Healthy Eating:  Healthy Eating Assessed Today: Yes  Cultural/Sikhism diet restrictions?: No  Meals include: Breakfast, Lunch, Dinner  Breakfast: 2 eggs, 2 wheat toast, water/coffee  Lunch: <1 cup brown rice or bulgar or beans or potatoes, chicken, vegetables (broccoli)  Dinner: Watermelon + yogurt OR cereal with milk + vegetables OR PB sandwich  Snacks: fruit, nuts  Beverages: Tea, Water  Has patient met with a dietitian in the past?: Yes    Being Active:  Being Active Assessed Today: Yes  Exercise:: Yes (has not been exercising)  Days per week of moderate to strenuous exercise (like a brisk walk): 4  On average, minutes per day of exercise at this level: 30  How intense was your typical exercise? : Light " (like stretching or slow walking)  Exercise Minutes per Week: 120  Barrier to exercise: None    Monitoring:  Monitoring Assessed Today: Yes  Did patient bring glucose meter to appointment? : Yes  Times checking blood sugar at home (number): 1  Times checking blood sugar at home (per): Day        Taking Medications:      Taking Medication Assessed Today: Yes  Current Treatments: None, Diet    Problem Solving:  Problem Solving Assessed Today: Yes  Is the patient at risk for hypoglycemia?: No  Is the patient at risk for DKA?: No              Reducing Risks:  Reducing Risks Assessed Today: Yes  Diabetes Risks: History of gestational diabetes, Ethnicity  Additional female risks: Gestational Diabetes    Healthy Coping:  Healthy Coping Assessed Today: Yes  Emotional response to diabetes: Ready to learn  Informal Support system:: Family  Stage of change: ACTION (Actively working towards change)  Support resources: None  Patient Activation Measure Survey Score:       View : No data to display.                  Care Plan and Education Provided:  Care Plan: Diabetes   Updates made by Belinda Bennett RD since 5/9/2023 12:00 AM      Problem: HbA1C Not In Goal       Goal: Establish Regular Follow-Ups with PCP       Task: Discuss with PCP the recommended timing for patient's next follow up visit(s)    Responsible User: Belinda Bnenett RD      Task: Discuss schedule for PCP visits with patient    Responsible User: Belinda Bennett RD      Goal: Get HbA1C Level in Goal       Task: Educate patient on diabetes education self-management topics Completed 5/9/2023   Responsible User: Belinda Bennett RD      Task: Educate patient on benefits of regular glucose monitoring    Responsible User: Bleinda Bennett RD      Task: Refer patient to appropriate extended care team member, as needed (Medication Therapy Management, Behavioral Health, Physical Therapy, etc.)    Responsible User: Belinda Bennett RD      Task: Discuss  diabetes treatment plan with patient Completed 5/9/2023   Responsible User: Belinda Bennett RD      Problem: Diabetes Self-Management Education Needed to Optimize Self-Care Behaviors       Goal: Understand diabetes pathophysiology and disease progression       Task: Provide education on diabetes pathophysiology and disease progression specfic to patient's diabetes type Completed 5/9/2023   Responsible User: Belinda Bennett RD      Goal: Healthy Eating - follow a healthy eating pattern for diabetes       Task: Provide education on portion control and consistency in amount, composition and timing of food intake Completed 5/9/2023   Responsible User: Belinda Bennett RD      Task: Provide education on managing carbohydrate intake (carbohydrate counting, plate planning method, etc.)    Responsible User: Belinda Bennett RD      Task: Provide education on weight management    Responsible User: Belinda Bennett RD      Task: Provide education on heart healthy eating    Responsible User: Belinda Bennett RD      Task: Provide education on eating out    Responsible User: Belinda Bennett RD      Task: Develop individualized healthy eating plan with patient Completed 5/9/2023   Responsible User: Belinda Bennett RD      Goal: Being Active - get regular physical activity, working up to at least 150 minutes per week       Task: Provide education on relationship of activity to glucose and precautions to take if at risk for low glucose Completed 5/9/2023   Responsible User: Belinda Bennett RD      Task: Discuss barriers to physical activity with patient    Responsible User: Belinda Bennett RD      Task: Develop physical activity plan with patient    Responsible User: Belinda Bennett RD      Task: Explore community resources including walking groups, assistance programs, and home videos    Responsible User: Belinda Bennett RD      Goal: Monitoring - monitor glucose and ketones as directed    This  Visit's Progress: 50%   Note:    Test fasting blood sugar 3-4x/week     Task: Provide education on blood glucose monitoring (purpose, proper technique, frequency, glucose targets, interpreting results, when to use glucose control solution, sharps disposal) Completed 5/9/2023   Responsible User: Belinda Bennett RD      Task: Provide education on continuous glucose monitoring (sensor placement, use of sabrina or /reader, understanding glucose trends, alerts and alarms, differences between sensor glucose and blood glucose)    Responsible User: Belinda Bennett RD      Task: Provide education on ketone monitoring (when to monitor, frequency, etc.)    Responsible User: Belinda Bennett RD      Goal: Taking Medication - patient is consistently taking medications as directed       Task: Provide education on action of prescribed medication, including when to take and possible side effects    Responsible User: Belinda Bennett RD      Task: Provide education on insulin and injectable diabetes medications, including administration, storage, site selection and rotation for injection sites    Responsible User: Belinda Bennett RD      Task: Discuss barriers to medication adherence with patient and provide management technique ideas as appropriate    Responsible User: Belinda Bennett RD      Task: Provide education on frequency and refill details of medications    Responsible User: Belinda Bennett RD      Goal: Problem Solving - know how to prevent and manage short-term diabetes complications       Task: Provide education on high blood glucose - causes, signs/symptoms, prevention and treatment    Responsible User: Belinda Bennett RD      Task: Provide education on low blood glucose - causes, signs/symptoms, prevention, treatment, carrying a carbohydrate source at all times, and medical identification    Responsible User: Belinda Bennett RD      Task: Provide education on safe travel with diabetes     Responsible User: Belinda Bennett RD      Task: Provide education on how to care for diabetes on sick days    Responsible User: Belinda Bennett RD      Task: Provide education on when to call a health care provider    Responsible User: Belinda Bennett RD      Goal: Reducing Risks - know how to prevent and treat long-term diabetes complications       Task: Provide education on major complications of diabetes, prevention, early diagnostic measures and treatment of complications    Responsible User: Belinda Bennett RD      Task: Provide education on recommended care for dental, eye and foot health    Responsible User: Belinda Bennett RD      Task: Provide education on Hemoglobin A1c - goals and relationship to blood glucose levels Completed 5/9/2023   Responsible User: Belinda Bennett RD      Task: Provide education on recommendations for heart health - lipid levels and goals, blood pressure and goals, and aspirin therapy, if indicated    Responsible User: Belinda Bennett RD      Task: Provide education on tobacco cessation    Responsible User: Belinda Bennett RD      Goal: Healthy Coping - use available resources to cope with the challenges of managing diabetes       Task: Discuss recognizing feelings about having diabetes    Responsible User: Belinda Bennett RD      Task: Provide education on the benefits of making appropriate lifestyle changes Completed 5/9/2023   Responsible User: Belinda Bennett RD      Task: Provide education on benefits of utilizing support systems    Responsible User: Belinda Bennett RD      Task: Discuss methods for coping with stress    Responsible User: Belinda Bennett RD      Task: Provide education on when to seek professional counseling    Responsible User: Belinda Bennett RD Tami Carpenter, RDN LD [facility]      Time Spent: 30 minutes  Encounter Type: Individual    Any diabetes medication dose changes were made via the CDE Protocol per the  patient's referring provider. A copy of this encounter was shared with the provider.

## 2023-06-06 ENCOUNTER — LAB (OUTPATIENT)
Dept: LAB | Facility: CLINIC | Age: 43
End: 2023-06-06
Payer: COMMERCIAL

## 2023-06-06 DIAGNOSIS — E11.9 TYPE 2 DIABETES MELLITUS WITHOUT COMPLICATION, WITHOUT LONG-TERM CURRENT USE OF INSULIN (H): ICD-10-CM

## 2023-06-06 LAB — HBA1C MFR BLD: 6.7 % (ref 0–5.6)

## 2023-06-06 PROCEDURE — 83036 HEMOGLOBIN GLYCOSYLATED A1C: CPT

## 2023-06-06 PROCEDURE — 36415 COLL VENOUS BLD VENIPUNCTURE: CPT

## 2023-06-21 ENCOUNTER — MEDICAL CORRESPONDENCE (OUTPATIENT)
Dept: HEALTH INFORMATION MANAGEMENT | Facility: CLINIC | Age: 43
End: 2023-06-21
Payer: COMMERCIAL

## 2023-07-25 ENCOUNTER — VIRTUAL VISIT (OUTPATIENT)
Dept: ENDOCRINOLOGY | Facility: CLINIC | Age: 43
End: 2023-07-25
Payer: COMMERCIAL

## 2023-07-25 DIAGNOSIS — E11.9 TYPE 2 DIABETES MELLITUS WITHOUT COMPLICATION, WITHOUT LONG-TERM CURRENT USE OF INSULIN (H): Primary | ICD-10-CM

## 2023-07-25 DIAGNOSIS — O24.414 INSULIN CONTROLLED GESTATIONAL DIABETES MELLITUS (GDM) DURING PREGNANCY, ANTEPARTUM: ICD-10-CM

## 2023-07-25 PROCEDURE — 99213 OFFICE O/P EST LOW 20 MIN: CPT | Mod: VID | Performed by: INTERNAL MEDICINE

## 2023-07-25 RX ORDER — BLOOD-GLUCOSE SENSOR
1 EACH MISCELLANEOUS
Qty: 2 EACH | Refills: 11 | Status: SHIPPED | OUTPATIENT
Start: 2023-07-25

## 2023-07-25 NOTE — LETTER
7/25/2023         RE: Steve Pena  [address] [address]        Dear Colleague,    Thank you for referring your patient, Steve Pena, to the [facility] [place]. Please see a copy of my visit note below.      Video-Visit Details    Type of service:  Video Visit  Video Start Time: 3:09  Video End Time: 3:28  Originating Location (pt. Location): Home, [place]  Distant Location (provider location):  Home  Platform used for Video Visit: Rosi Krishnan MD    Steve Pena is a 42 year old year old female here for follow-up of post partum gestational diabetes via a billable video visit.      INTERVAL HISTORY:  Delivered baby 12/14, doing well  Has been pumping but not direct feeding and fading, stopped as of yesterday  Checking BG daily-- range from fasting in AM-- <130 almost always, aprox 105-115  OGTT completed last week          1) Diabetes Mellitus Type 2    Diabetes History:  Diagnosis: Diagnosed in July 2022 with early gestational diabetes. 3 other pregnancies without gestational diabetes. Baby born 12/2022 and had hypoglycemia, 2 day NICU stay. OGTT completed 6 weeks as below completed incorrectly without baseline (see below). However A1C completed 4 and again 6 mo postpartum with 6.8 and 6.7%  Hospitalizations: none  Previous Regimens: Levemir 30u BID, Novolog 6u with meals,   Current Regimen: none    6/29/2022- A1C 6.4%    Diet-  - Avoiding juice, processed sugars, skips breakfast, only calorie free beverages    Trouble with losing weight after birth, dropped down but has plateaued where she is at (approx 203)  Tries to sleep regularly     Still checking fasting, 2hr post prandial   Fasting <130 almost always, aprox 105-115  Highest 2hr post prandial is about 145    OGTT 2/18/2023  Fasting- 170-- (however patient noted this was drawn 1hr after drank glucose solution, did not have baseline level)  2hr- 138    Still doing exercise since delivery, treadmill, walks  for a while 2-3 days a week (~300 calories)  Monitoring regularly        BP Readings from Last 3 Encounters:   04/20/23 127/77   04/19/23 112/69   01/26/23 123/76       Lab Results   Component Value Date    A1C 6.4 06/29/2022       Wt Readings from Last 3 Encounters:   04/20/23 96 kg (211 lb 9.6 oz)   04/19/23 95.7 kg (211 lb)   01/26/23 91.8 kg (202 lb 6.4 oz)       Current Outpatient Medications   Medication Sig Dispense Refill     Continuous Blood Gluc Sensor (FREESTYLE MISTI 3 SENSOR) MISC 1 each every 14 days 2 each 11     norelgestromin-ethinyl estradiol (ORTHO EVRA) 150-35 MCG/24HR patch Remove old patch and apply new patch onto the skin once a week for 3 weeks (21 days). Do not wear patch week 4 (days 22-28), then repeat. 3 patch 11     semaglutide (OZEMPIC) 2 MG/1.5ML SOPN pen Inject 0.25 mg Subcutaneous every 7 days for 28 days, THEN 0.5 mg every 7 days for 360 days. Inject 0.25 mg subcutaneous weekly x 4 weeks, then 0.5 mg weekly 3 mL 4     ACCU-CHEK GUIDE test strip Use to test blood sugar 4x daily. (Patient not taking: Reported on 4/19/2023) 200 strip 4     blood glucose monitoring (SOFTCLIX) lancets USE TO TEST BLOOD SUGAR 4 TIMES DAILY (Patient not taking: Reported on 4/19/2023) 100 each 2     Prenatal Vit-Fe Fumarate-FA (PNV PRENATAL PLUS MULTIVITAMIN) 27-1 MG TABS per tablet Take 1 tablet by mouth daily (Patient not taking: Reported on 4/19/2023) 100 tablet 3          REVIEW OF SYSTEMS:   ROS: 10 point ROS neg other than the symptoms noted above in the HPI.      EXAM:  Not completed for this phone visit    ASSESSMENT/PLAN:  No diagnosis found.    1) Diabetes Mellitus type 2  - Glucose Control- at goal based on fingerstick and A1C, although new diagnosis of diabetes (managed with lifestyle)   - doing well, she prefers to be more aggressive and start trial of Ozempic   - - Start GLP1 agonist-- Ozempic 0.25 x4 weeks then increase to 0.5mg weekly   - No personal hx of pancreatitis, no family history of  medullary thyroid cancer   - Reviewed common side effects of nausea/bloating, slow food moving through stomach, cramping abdominal pain   - Start FiveRuns Hortensia 3, will download sabrina on phone and have pharmacist help her apply first one (or can watch videos). If this is not covered, she will decrease SMBG to 1-2x weekly   She is no longer breastfeeding  Update screening labs, make appt for eye exam (not done within [place])    Orders Placed This Encounter   Procedures     Albumin Random Urine Quantitative with Creat Ratio     Basic metabolic panel     Hemoglobin A1c     Lipid panel reflex to direct LDL Fasting     TSH with free T4 reflex     Vitamin D Deficiency       RTC- 6 months    A total of 28 minutes were spent today 07/25/23 on this visit including chart review, history and counseling, documentation and other activities as detailed above.         Again, thank you for allowing me to participate in the care of your patient.        Sincerely,        Shae Krishnan MD

## 2023-07-25 NOTE — PROGRESS NOTES
Video-Visit Details    Type of service:  Video Visit  Video Start Time: 3:09  Video End Time: 3:28  Originating Location (pt. Location): Home, [place]  Distant Location (provider location):  Home  Platform used for Video Visit: Rosi Krishnan MD    Steve Pena is a 42 year old year old female here for follow-up of post partum gestational diabetes via a billable video visit.      INTERVAL HISTORY:  Delivered baby 12/14, doing well  Has been pumping but not direct feeding and fading, stopped as of yesterday  Checking BG daily-- range from fasting in AM-- <130 almost always, aprox 105-115  OGTT completed last week          1) Diabetes Mellitus Type 2    Diabetes History:  Diagnosis: Diagnosed in July 2022 with early gestational diabetes. 3 other pregnancies without gestational diabetes. Baby born 12/2022 and had hypoglycemia, 2 day NICU stay. OGTT completed 6 weeks as below completed incorrectly without baseline (see below). However A1C completed 4 and again 6 mo postpartum with 6.8 and 6.7%  Hospitalizations: none  Previous Regimens: Levemir 30u BID, Novolog 6u with meals,   Current Regimen: none    6/29/2022- A1C 6.4%    Diet-  - Avoiding juice, processed sugars, skips breakfast, only calorie free beverages    Trouble with losing weight after birth, dropped down but has plateaued where she is at (approx 203)  Tries to sleep regularly     Still checking fasting, 2hr post prandial   Fasting <130 almost always, aprox 105-115  Highest 2hr post prandial is about 145    OGTT 2/18/2023  Fasting- 170-- (however patient noted this was drawn 1hr after drank glucose solution, did not have baseline level)  2hr- 138    Still doing exercise since delivery, treadmill, walks for a while 2-3 days a week (~300 calories)  Monitoring regularly        BP Readings from Last 3 Encounters:   04/20/23 127/77   04/19/23 112/69   01/26/23 123/76       Lab Results   Component Value Date    A1C 6.4 06/29/2022       Wt Readings from  Last 3 Encounters:   04/20/23 96 kg (211 lb 9.6 oz)   04/19/23 95.7 kg (211 lb)   01/26/23 91.8 kg (202 lb 6.4 oz)       Current Outpatient Medications   Medication Sig Dispense Refill     Continuous Blood Gluc Sensor (FREESTYLE MISTI 3 SENSOR) MISC 1 each every 14 days 2 each 11     norelgestromin-ethinyl estradiol (ORTHO EVRA) 150-35 MCG/24HR patch Remove old patch and apply new patch onto the skin once a week for 3 weeks (21 days). Do not wear patch week 4 (days 22-28), then repeat. 3 patch 11     semaglutide (OZEMPIC) 2 MG/1.5ML SOPN pen Inject 0.25 mg Subcutaneous every 7 days for 28 days, THEN 0.5 mg every 7 days for 360 days. Inject 0.25 mg subcutaneous weekly x 4 weeks, then 0.5 mg weekly 3 mL 4     ACCU-CHEK GUIDE test strip Use to test blood sugar 4x daily. (Patient not taking: Reported on 4/19/2023) 200 strip 4     blood glucose monitoring (SOFTCLIX) lancets USE TO TEST BLOOD SUGAR 4 TIMES DAILY (Patient not taking: Reported on 4/19/2023) 100 each 2     Prenatal Vit-Fe Fumarate-FA (PNV PRENATAL PLUS MULTIVITAMIN) 27-1 MG TABS per tablet Take 1 tablet by mouth daily (Patient not taking: Reported on 4/19/2023) 100 tablet 3          REVIEW OF SYSTEMS:   ROS: 10 point ROS neg other than the symptoms noted above in the HPI.      EXAM:  Not completed for this phone visit    ASSESSMENT/PLAN:  No diagnosis found.    1) Diabetes Mellitus type 2  - Glucose Control- at goal based on fingerstick and A1C, although new diagnosis of diabetes (managed with lifestyle)   - doing well, she prefers to be more aggressive and start trial of Ozempic   - - Start GLP1 agonist-- Ozempic 0.25 x4 weeks then increase to 0.5mg weekly   - No personal hx of pancreatitis, no family history of medullary thyroid cancer   - Reviewed common side effects of nausea/bloating, slow food moving through stomach, cramping abdominal pain   - Start Freestyle Misti 3, will download sabrina on phone and have pharmacist help her apply first one (or can  watch videos). If this is not covered, she will decrease SMBG to 1-2x weekly   She is no longer breastfeeding  Update screening labs, make appt for eye exam (not done within [place])    Orders Placed This Encounter   Procedures     Albumin Random Urine Quantitative with Creat Ratio     Basic metabolic panel     Hemoglobin A1c     Lipid panel reflex to direct LDL Fasting     TSH with free T4 reflex     Vitamin D Deficiency       RTC- 6 months    A total of 28 minutes were spent today 07/25/23 on this visit including chart review, history and counseling, documentation and other activities as detailed above.

## 2023-07-26 ENCOUNTER — TELEPHONE (OUTPATIENT)
Dept: ENDOCRINOLOGY | Facility: CLINIC | Age: 43
End: 2023-07-26
Payer: COMMERCIAL

## 2023-07-26 NOTE — TELEPHONE ENCOUNTER
[facility] Call Center    Phone Message    May a detailed message be left on voicemail: yes     Reason for Call: Medication Refill Request    Has the patient contacted the pharmacy for the refill? Yes   Name of medication being requested: semaglutide (OZEMPIC) 2 MG/1.5ML SOPN pen   Provider who prescribed the medication: Shae Krishnan MD  Pharmacy: [id number] - [place], [place] - [address]  Date medication is needed: asap    Action Taken: Message routed to:  [facility] ([facility] Endo    Travel Screening: Not Applicable

## 2023-07-26 NOTE — TELEPHONE ENCOUNTER
RN reviewed chart.  Provider sent refills yesterday of which pharmacy confirmed receiving this afternoon.  Will send mychart msg to pt to contact pharmacy to see when ready to .Vickie Gillespie RN

## 2023-07-27 ENCOUNTER — TELEPHONE (OUTPATIENT)
Dept: ENDOCRINOLOGY | Facility: CLINIC | Age: 43
End: 2023-07-27

## 2023-07-27 NOTE — TELEPHONE ENCOUNTER
Prior Authorization Retail Medication Request    Medication/Dose: Semaglutide (OZEMPIC) 2 MG/1.5ML SOPN pen  ICD code (if different than what is on RX):  E11.9       Insurance Name:  [employer]US - [employer] [place]   Insurance ID:  [id number]       Pharmacy Information (if different than what is on RX)  Name:  [facility]  Phone:  [phone]

## 2023-07-27 NOTE — TELEPHONE ENCOUNTER
Per Patient states she has not had the medication for 3 days and wanted to get an update on the prescription. Patient states she is needing the clinic to fill out a form in order for insurance to cover the medication. Patient is wanting to get a call back when this has been done. Please advise.

## 2023-08-01 ENCOUNTER — PRE VISIT (OUTPATIENT)
Dept: ORTHOPEDICS | Facility: CLINIC | Age: 43
End: 2023-08-01
Payer: COMMERCIAL

## 2023-08-01 NOTE — TELEPHONE ENCOUNTER
DIAGNOSIS: left knee pain    APPOINTMENT DATE: 08/01/2023   NOTES STATUS DETAILS   OFFICE NOTE from referring provider N/A    OFFICE NOTE from other specialist N/A    DISCHARGE SUMMARY from hospital N/A    DISCHARGE REPORT from the ER N/A    OPERATIVE REPORT N/A    EMG report N/A    MEDICATION LIST N/A    MRI N/A    DEXA (osteoporosis/bone health) N/A    CT SCAN N/A    XRAYS (IMAGES & REPORTS) N/A

## 2023-08-03 ENCOUNTER — TELEPHONE (OUTPATIENT)
Dept: ENDOCRINOLOGY | Facility: CLINIC | Age: 43
End: 2023-08-03
Payer: COMMERCIAL

## 2023-08-03 DIAGNOSIS — E11.9 TYPE 2 DIABETES MELLITUS WITHOUT COMPLICATION, WITHOUT LONG-TERM CURRENT USE OF INSULIN (H): Primary | ICD-10-CM

## 2023-08-03 NOTE — TELEPHONE ENCOUNTER
Prior Authorization Retail Medication Request Ozempic    Medication/Dose:   ICD code (if different than what is on RX):    Previously Tried and Failed:    Rationale:      OZEMPIC    KEY: [id number]

## 2023-08-07 NOTE — TELEPHONE ENCOUNTER
Central Prior Authorization Team  Phone: [phone]    PA Initiation    Medication: OZEMPIC (0.25 OR 0.5 MG/DOSE) 2 MG/3ML SC SOPN  Insurance Company: [employer] [facility] - Phone [phone] Fax [phone]  Pharmacy Filling the Rx: [id number] - [place], [place] - [address] AT [facility]  Filling Pharmacy Phone: [phone]  Filling Pharmacy Fax:    Start Date: 8/7/2023

## 2023-08-08 ENCOUNTER — LAB (OUTPATIENT)
Dept: LAB | Facility: CLINIC | Age: 43
End: 2023-08-08
Payer: COMMERCIAL

## 2023-08-08 DIAGNOSIS — E11.9 TYPE 2 DIABETES MELLITUS WITHOUT COMPLICATION, WITHOUT LONG-TERM CURRENT USE OF INSULIN (H): ICD-10-CM

## 2023-08-08 LAB
CREAT UR-MCNC: 90.5 MG/DL
HBA1C MFR BLD: 6.6 % (ref 0–5.6)
MICROALBUMIN UR-MCNC: 24.8 MG/L
MICROALBUMIN/CREAT UR: 27.4 MG/G CR (ref 0–25)
TSH SERPL DL<=0.005 MIU/L-ACNC: 1.3 UIU/ML (ref 0.3–4.2)

## 2023-08-08 PROCEDURE — 36415 COLL VENOUS BLD VENIPUNCTURE: CPT

## 2023-08-08 PROCEDURE — 82306 VITAMIN D 25 HYDROXY: CPT

## 2023-08-08 PROCEDURE — 83036 HEMOGLOBIN GLYCOSYLATED A1C: CPT

## 2023-08-08 PROCEDURE — 84443 ASSAY THYROID STIM HORMONE: CPT

## 2023-08-08 PROCEDURE — 82043 UR ALBUMIN QUANTITATIVE: CPT

## 2023-08-08 PROCEDURE — 82570 ASSAY OF URINE CREATININE: CPT

## 2023-08-08 NOTE — TELEPHONE ENCOUNTER
PRIOR AUTHORIZATION DENIED    Medication: OZEMPIC (0.25 OR 0.5 MG/DOSE) [facility]  Insurance Company: [employer] - Phone [phone] Fax [phone]  Denial Date: 8/8/2023    Denial Rational: Patient must have a history of trial & failure to 2 formulary alternatives or have a contraindication or intolerance to the formulary alternatives:    Appeal Information: If provider would like to appeal please provide a letter of medical necessity.

## 2023-08-09 LAB — DEPRECATED CALCIDIOL+CALCIFEROL SERPL-MC: 27 UG/L (ref 20–75)

## 2023-08-10 NOTE — TELEPHONE ENCOUNTER
Mychart msg sent to pt to notify that Bydureon was sent to pharmacy since ozempic was denied. Vickie Gillespie RN

## 2023-08-17 ENCOUNTER — LAB (OUTPATIENT)
Dept: LAB | Facility: CLINIC | Age: 43
End: 2023-08-17
Payer: COMMERCIAL

## 2023-08-17 DIAGNOSIS — E11.9 TYPE 2 DIABETES MELLITUS WITHOUT COMPLICATION, WITHOUT LONG-TERM CURRENT USE OF INSULIN (H): ICD-10-CM

## 2023-08-17 LAB
ANION GAP SERPL CALCULATED.3IONS-SCNC: 10 MMOL/L (ref 7–15)
BUN SERPL-MCNC: 14.5 MG/DL (ref 6–20)
CALCIUM SERPL-MCNC: 9.2 MG/DL (ref 8.6–10)
CHLORIDE SERPL-SCNC: 102 MMOL/L (ref 98–107)
CHOLEST SERPL-MCNC: 188 MG/DL
CREAT SERPL-MCNC: 0.91 MG/DL (ref 0.51–0.95)
DEPRECATED HCO3 PLAS-SCNC: 27 MMOL/L (ref 22–29)
GFR SERPL CREATININE-BSD FRML MDRD: 81 ML/MIN/1.73M2
GLUCOSE SERPL-MCNC: 119 MG/DL (ref 70–99)
HDLC SERPL-MCNC: 68 MG/DL
LDLC SERPL CALC-MCNC: 99 MG/DL
NONHDLC SERPL-MCNC: 120 MG/DL
POTASSIUM SERPL-SCNC: 3.7 MMOL/L (ref 3.4–5.3)
SODIUM SERPL-SCNC: 139 MMOL/L (ref 136–145)
TRIGL SERPL-MCNC: 107 MG/DL

## 2023-08-17 PROCEDURE — 80048 BASIC METABOLIC PNL TOTAL CA: CPT

## 2023-08-17 PROCEDURE — 36415 COLL VENOUS BLD VENIPUNCTURE: CPT

## 2023-08-17 PROCEDURE — 80061 LIPID PANEL: CPT

## 2023-08-30 ENCOUNTER — OFFICE VISIT (OUTPATIENT)
Dept: PODIATRY | Facility: CLINIC | Age: 43
End: 2023-08-30
Payer: COMMERCIAL

## 2023-08-30 DIAGNOSIS — E11.9 DIABETES MELLITUS WITHOUT COMPLICATION (H): Primary | ICD-10-CM

## 2023-08-30 PROCEDURE — 99203 OFFICE O/P NEW LOW 30 MIN: CPT | Performed by: PODIATRIST

## 2023-08-30 ASSESSMENT — PAIN SCALES - GENERAL: PAINLEVEL: EXTREME PAIN (8)

## 2023-08-30 NOTE — TELEPHONE ENCOUNTER
DIAGNOSIS: left knee and left wrist pain    APPOINTMENT DATE: 09/16/2023   NOTES STATUS DETAILS   OFFICE NOTE from referring provider Internal 08/30/2023 Dr Gtz [facility]    OFFICE NOTE from other specialist Care Everywhere 08/08/2023 [facility]    DISCHARGE SUMMARY from hospital N/A    DISCHARGE REPORT from the ER N/A    OPERATIVE REPORT N/A    EMG report N/A    MEDICATION LIST N/A    MRI N/A    DEXA (osteoporosis/bone health) N/A    CT SCAN N/A    XRAYS (IMAGES & REPORTS) Received 08/08/2023 LFT knee     Images in PACS

## 2023-08-30 NOTE — LETTER
8/30/2023         RE: Steve Pena  [address] [place] [address]        Dear Colleague,    Thank you for referring your patient, Steve Pena, to the [facility]. Please see a copy of my visit note below.    Past Medical History:   Diagnosis Date     No pertinent past medical history      Patient Active Problem List   Diagnosis     Antepartum anemia     Multigravida of advanced maternal age in second trimester     Insulin controlled gestational diabetes mellitus (GDM) during pregnancy, antepartum     Right shoulder pain     Postpartum care and examination     Diabetes mellitus, type 2 (H)     Past Surgical History:   Procedure Laterality Date     NO HISTORY OF SURGERY       Social History     Socioeconomic History     Marital status:      Spouse name: Not on file     Number of children: Not on file     Years of education: Not on file     Highest education level: Not on file   Occupational History     Not on file   Tobacco Use     Smoking status: Never     Smokeless tobacco: Never   Vaping Use     Vaping Use: Never used   Substance and Sexual Activity     Alcohol use: Not Currently     Drug use: Never     Sexual activity: Yes     Partners: Male     Birth control/protection: Injection   Other Topics Concern     Not on file   Social History Narrative     Not on file     Social Determinants of Health     Financial Resource Strain: Not on file   Food Insecurity: Not on file   Transportation Needs: Not on file   Physical Activity: Not on file   Stress: Not on file   Social Connections: Not on file   Intimate Partner Violence: Not on file   Housing Stability: Not on file     No family history on file.      Lab Results   Component Value Date    A1C 6.6 08/08/2023    A1C 6.7 06/06/2023    A1C 6.8 04/04/2023    A1C 6.4 06/29/2022     Last Comprehensive Metabolic Panel:  Lab Results   Component Value Date     08/17/2023    POTASSIUM 3.7 08/17/2023    CHLORIDE 102 08/17/2023     CO2 27 08/17/2023    ANIONGAP 10 08/17/2023     (H) 08/17/2023    BUN 14.5 08/17/2023    CR 0.91 08/17/2023    GFRESTIMATED 81 08/17/2023    KYLIE 9.2 08/17/2023       Subjective findings- 42-year-old presents for diabetic foot check.  Relates her feet are doing well but she is getting pain in her left knee and her left wrist.  Relates to no numbness tingling neuropathy, no ulcers or sores, no injuries, uses lotion on her feet.    Objective findings- DP and PT are 2 out of 4 bilaterally.  Sharp/dull is intact with 5.07 [place] Elvis monofilament bilaterally, proprioception is intact bilaterally, deep tendon reflex are intact bilaterally, no gross tendon voids bilaterally, minimally dorsally contracted digits bilaterally, she has nail polish on but does appear to have some thickening and dystrophy on the distal ends of the nails, no erythema bilaterally, no drainage bilaterally, no odor bilaterally, no calor bilaterally.    Assessment and plan- Diabetic foot cares.  Diagnosis and treatment options discussed with the patient.  Advised her on vinegar water soaks and over-the-counter antifungal cream for the Onychomycosis changes to the toenails.  Diabetic foot cares discussed with her.  Referral to sports medicine given for her left knee and wrist pain.  Return to clinic for diabetic foot check at least once a year.          Low level of medical decision making.      Again, thank you for allowing me to participate in the care of your patient.        Sincerely,        Farhat Gtz DPM

## 2023-08-30 NOTE — PROGRESS NOTES
Past Medical History:   Diagnosis Date    No pertinent past medical history      Patient Active Problem List   Diagnosis    Antepartum anemia    Multigravida of advanced maternal age in second trimester    Insulin controlled gestational diabetes mellitus (GDM) during pregnancy, antepartum    Right shoulder pain    Postpartum care and examination    Diabetes mellitus, type 2 (H)     Past Surgical History:   Procedure Laterality Date    NO HISTORY OF SURGERY       Social History     Socioeconomic History    Marital status:      Spouse name: Not on file    Number of children: Not on file    Years of education: Not on file    Highest education level: Not on file   Occupational History    Not on file   Tobacco Use    Smoking status: Never    Smokeless tobacco: Never   Vaping Use    Vaping Use: Never used   Substance and Sexual Activity    Alcohol use: Not Currently    Drug use: Never    Sexual activity: Yes     Partners: Male     Birth control/protection: Injection   Other Topics Concern    Not on file   Social History Narrative    Not on file     Social Determinants of Health     Financial Resource Strain: Not on file   Food Insecurity: Not on file   Transportation Needs: Not on file   Physical Activity: Not on file   Stress: Not on file   Social Connections: Not on file   Intimate Partner Violence: Not on file   Housing Stability: Not on file     No family history on file.      Lab Results   Component Value Date    A1C 6.6 08/08/2023    A1C 6.7 06/06/2023    A1C 6.8 04/04/2023    A1C 6.4 06/29/2022     Last Comprehensive Metabolic Panel:  Lab Results   Component Value Date     08/17/2023    POTASSIUM 3.7 08/17/2023    CHLORIDE 102 08/17/2023    CO2 27 08/17/2023    ANIONGAP 10 08/17/2023     (H) 08/17/2023    BUN 14.5 08/17/2023    CR 0.91 08/17/2023    GFRESTIMATED 81 08/17/2023    KYLIE 9.2 08/17/2023       Subjective findings- 42-year-old presents for diabetic foot check.  Relates her feet are  doing well but she is getting pain in her left knee and her left wrist.  Relates to no numbness tingling neuropathy, no ulcers or sores, no injuries, uses lotion on her feet.    Objective findings- DP and PT are 2 out of 4 bilaterally.  Sharp/dull is intact with 5.07 [place] Elvis monofilament bilaterally, proprioception is intact bilaterally, deep tendon reflex are intact bilaterally, no gross tendon voids bilaterally, minimally dorsally contracted digits bilaterally, she has nail polish on but does appear to have some thickening and dystrophy on the distal ends of the nails, no erythema bilaterally, no drainage bilaterally, no odor bilaterally, no calor bilaterally.    Assessment and plan- Diabetic foot cares.  Diagnosis and treatment options discussed with the patient.  Advised her on vinegar water soaks and over-the-counter antifungal cream for the Onychomycosis changes to the toenails.  Diabetic foot cares discussed with her.  Referral to sports medicine given for her left knee and wrist pain.  Return to clinic for diabetic foot check at least once a year.          Low level of medical decision making.

## 2023-08-30 NOTE — NURSING NOTE
Steve Pena's chief complaint for this visit includes:  Chief Complaint   Patient presents with    Left Foot - Pain, New Patient     Pain for 3 weeks     PCP: No Ref-Primary, Physician    Referring Provider:  No referring provider defined for this encounter.    There were no vitals taken for this visit.  Extreme Pain (8)     Do you need any medication refills at today's visit? NO    No Known Allergies    Carlos Choudhury, EMT

## 2023-09-14 NOTE — PROGRESS NOTES
Steve Pena  :  1980  DOS: 2023  MRN: [id number]  PCP: No Ref-Primary, Physician    Sports Medicine Clinic Visit      HPI  Steve Pena is a 42 year old female who is seen as a self referral presenting with left wrist and left knee pain.    - Mechanism of Injury:  No inciting injury.   - Prior evaluation:   - UC on 2023 for approximately 1 week of 8/10 severity left knee pain, no redness/bruising/swelling/grinding, no numbness/tingling/weakness.  Pain with weightbearing and walking, and leg pain.  X-rays showed a small knee joint effusion with otherwise normal anatomic alignment and no acute fracture or dislocation, no significant degenerative changes.  Diagnosed with a likely sprain, prescribed a short course of prednisone and recommended physical therapy if symptoms persist.  -No info or images on left wrist.    - Pain Character:  Pain has been present for  1-2 months .  Pain is well localized to the anterior knee without significant radiation.   - Endorses: Painful clicking, popping, mechanical locking symptoms occasionally, especially with deep squatting and pivoting/twisting.  Also endorses instability occasionally when walking.  Endorses swelling.  - Denies:  numbness, tingling, radicular shooting pain, weakness.  - Alleviating factors:   prednisone, heating  diclofenac and icy hot  - Aggravating factors:  getting in & out of chairs, deep squatting, pivoting/twisting  - Treatments tried:    prednisone, heating  diclofenac and icy hot    - Patient Goals:  get a formal diagnosis, be able to manage the symptoms successfully, discuss treatment options  - Social History: currently employed as CNA    - Pertinent PMH:  T2DM      Review of Systems  Musculoskeletal: as above  Remainder of review of systems is negative including constitutional, CV, pulmonary, GI, Skin and Neurologic except as noted in HPI or medical history.    Past Medical History:   Diagnosis Date    No pertinent past medical  history      Past Surgical History:   Procedure Laterality Date    NO HISTORY OF SURGERY       No family history on file.      Objective  There were no vitals taken for this visit.    General: healthy, alert and in no acute distress.    HEENT: no scleral icterus or conjunctival erythema.   Skin: no suspicious lesions or rash. No jaundice.   CV: regular rhythm by palpation, 2+ distal pulses.  Resp: normal respiratory effort without conversational dyspnea.   Psych: normal mood and affect.    Gait: nonantalgic, appropriate coordination and balance.     Neuro:        - Sensation to light touch:    - Intact throughout the BLE including all peripheral nerve distributions.     MSK - Knee:       - Inspection:    -Mild effusion present on the left without surrounding erythema, warmth, ecchymosis, lesion.       - ROM:    - Full AROM/PROM with anterior and posterior knee pain during terminal knee flexion.  Also has a feeling of stiffness and tightness throughout knee flexion.       - Palpation:    - TTP at the medial joint line, lateral joint line, slightly in the popliteal fossa.   - NTTP elsewhere.        - Strength:  (*antalgic)  RLE LLE  - Hip Flexion  5 5   - Hip Extension 5 5   - Hip Abduction 5 5   - Hip Adduction 5 5  - Knee Flexion  5 5  - Knee Extension 5 5  - Dorsiflexion  5 5  - Plantarflexion 5 5  - Ext. Reji. Longus 5 5  - Inversion  5 5  - Eversion  5 5       - Special tests:        - Lachman: Equivocal/questionable due to guarding, but likely a solid endpoint is present.        - A/P drawer:  Neg       - Pivot shift:  Neg   - Marlys: Positive for lateral compartment click and pain     - Varus stress:  Neg for laxity or pain     - Valgus stress:  Neg for laxity or pain    - Patellar grind:  Neg    - Thessaly: Positive for lateral compartment click and pain       - Functional tests:   - Deep squat:  Able to perform, caused a feeling of lateral compartment click/lock and pain    Radiology  I independently  reviewed the available relevant imaging, with the following interpretation:   -XR with interpretation as above in HPI.      Assessment  1. Tear of lateral meniscus of left knee, current, unspecified tear type, initial encounter        Plan  Steve Pena is a 42 year old female that presents with subacute left knee pain with swelling, clicking, mechanical locking symptoms, and anterior and posterior knee pain especially with deep squatting and pivoting/twisting maneuvers.  Positive Marlys's and Thessaly's for lateral compartment pain and click that raises concern for a lateral meniscus tear.  Questionable Lachman due to guarding, no specific injury that would have led to an acute ACL tear but keeping on the differential based on symptoms.  History and physical exam appear most consistent with an acute tear of the lateral meniscus.  Symptoms may also be attributable to patellofemoral pain syndrome if no organic pathology is identified on the MRI.    Discussed the nature of the condition and treatment options and mutually agreed upon the following plan:    - Imaging:          - Reviewed relevant imaging in the chart.  - Reviewed results and images with patient.   - Medications:          - Discussed pharmacologic options for pain relief.   - May use NSAIDs (Ibuprofen, Naproxen) or Acetaminophen (Tylenol) as needed for pain control.   - May also use topical medications such as lidocaine, IcyHot, BioFreeze, or Voltaren gel as needed for pain control.   - Injections:          - Discussed possible injection options and alternatives.    - Options may include intra-articular knee joint injections with corticosteroid, viscosupplementation, or PRP.    - Deferred injections today in favor of obtaining the MRI first and will consider them in the future as needed.   - Therapy:          - Discussed the benefits of physical therapy vs home exercise program for optimization of range of motion, flexibility, strength, stability and  function.   -We will consider and develop a formalized therapy plan, depending on the results of the MRI.  - Modalities:          - May use ice, heat, massage or other modalities as needed.   - Bracing:          - Discussed bracing options and recommend using your home knee stability brace for now when walking and performing deep squatting, stairs, other activities that cause symptoms.    - Surgery:          - Discussed non-operative and operative treatment options for the patient's condition.   -Goal will be for conservative management unless surgery is indicated based on the results of the MRI.  - Activity:          - Encouraged to remain active and participate in regular activities as symptoms allow.    - Follow up:          -When results of the MRI are obtained to discuss the results, for re-evaluation and update to treatment plan, or sooner for new/worsening symptoms.  - Patient has clinic contact information for questions or concerns.       Nabil Tello DO, JESSICA  [facility] - Sports Medicine  [facility] - Department of Orthopedic Surgery       Disclaimer:  This note was prepared and written using Dragon Medical dictation software. As a result, there may be errors in the script that have gone undetected. Please consider this when interpreting the information in this note.

## 2023-09-16 ENCOUNTER — PRE VISIT (OUTPATIENT)
Dept: ORTHOPEDICS | Facility: CLINIC | Age: 43
End: 2023-09-16

## 2023-09-16 ENCOUNTER — OFFICE VISIT (OUTPATIENT)
Dept: ORTHOPEDICS | Facility: CLINIC | Age: 43
End: 2023-09-16
Payer: COMMERCIAL

## 2023-09-16 DIAGNOSIS — S83.282A TEAR OF LATERAL MENISCUS OF LEFT KNEE, CURRENT, UNSPECIFIED TEAR TYPE, INITIAL ENCOUNTER: ICD-10-CM

## 2023-09-16 DIAGNOSIS — M25.562 ACUTE PAIN OF LEFT KNEE: Primary | ICD-10-CM

## 2023-09-16 PROCEDURE — 99204 OFFICE O/P NEW MOD 45 MIN: CPT | Performed by: STUDENT IN AN ORGANIZED HEALTH CARE EDUCATION/TRAINING PROGRAM

## 2023-09-16 NOTE — PATIENT INSTRUCTIONS
Aleksandar Steve Pena ,     A copy of your assessment and our treatment plan that we discussed together is included below, as written in your medical chart.   If you have any questions, please feel free to call the clinic.     --------------------------------------------------  Steve Pena is a 42 year old female that presents with subacute left knee pain with swelling, clicking, mechanical locking symptoms, and anterior and posterior knee pain especially with deep squatting and pivoting/twisting maneuvers.  Positive Marlys's and Thessaly's for lateral compartment pain and click that raises concern for a lateral meniscus tear.  Questionable Lachman due to guarding, no specific injury that would have led to an acute ACL tear but keeping on the differential based on symptoms.  History and physical exam appear most consistent with an acute tear of the lateral meniscus.  Symptoms may also be attributable to patellofemoral pain syndrome if no organic pathology is identified on the MRI.    Discussed the nature of the condition and treatment options and mutually agreed upon the following plan:    - Imaging:          - Reviewed relevant imaging in the chart.  - Reviewed results and images with patient.   - Medications:          - Discussed pharmacologic options for pain relief.   - May use NSAIDs (Ibuprofen, Naproxen) or Acetaminophen (Tylenol) as needed for pain control.   - May also use topical medications such as lidocaine, IcyHot, BioFreeze, or Voltaren gel as needed for pain control.   - Injections:          - Discussed possible injection options and alternatives.    - Options may include intra-articular knee joint injections with corticosteroid, viscosupplementation, or PRP.    - Deferred injections today in favor of obtaining the MRI first and will consider them in the future as needed.   - Therapy:          - Discussed the benefits of physical therapy vs home exercise program for optimization of range of motion,  flexibility, strength, stability and function.   -We will consider and develop a formalized therapy plan, depending on the results of the MRI.  - Modalities:          - May use ice, heat, massage or other modalities as needed.   - Bracing:          - Discussed bracing options and recommend using your home knee stability brace for now when walking and performing deep squatting, stairs, other activities that cause symptoms.    - Surgery:          - Discussed non-operative and operative treatment options for the patient's condition.   -Goal will be for conservative management unless surgery is indicated based on the results of the MRI.  - Activity:          - Encouraged to remain active and participate in regular activities as symptoms allow.    - Follow up:          -When results of the MRI are obtained to discuss the results, for re-evaluation and update to treatment plan, or sooner for new/worsening symptoms.  - Patient has clinic contact information for questions or concerns.   --------------------------------------------------    It was a pleasure seeing you today. Thank you for choosing [facility] for your care.       Nabil Tello DO, CAQSM  [facility] - Sports Medicine  [facility] - Department of Orthopedic Surgery     Disclaimer:  This note was prepared and written using Dragon Medical dictation software. As a result, there may be errors in the script that have gone undetected. Please consider this when interpreting the information in this note.     -------------------------------------------------    MRI Scheduling Instructions  1.  Advanced imaging is done by appointment. Please call [facility] ([facility] [place]) [phone] to schedule your MRI at your earliest convenience.     - Some insurance companies may require a prior authorization to be completed which can delay the time until you are able to schedule your appointment.       - If  you are active on [employer], you may have access to your test results before your provider is able to review the study and advise on next steps.      2. After your MRI is scheduled, please call [phone] to schedule an in-person or telephone follow up appointment with your provider to discuss the results and updated treatment recommendations.     -------------------------------  Thanks for coming today.  Ortho/Sports Medicine Clinic  [id number] [place] [address] [place] [address]    To schedule future appointments in Ortho Clinic, you may call [phone].    To schedule ordered imaging or an injection ordered by your provider:  Call [facility] Injection scheduling line: [phone]    [employer]ANCRU available online at:  [email or web address]    Please call if any further questions or concerns ([phone]).  Clinic hours 8 am to 5 pm.    Return to clinic (call) if symptoms worsen or fail to improve.

## 2023-09-16 NOTE — LETTER
2023         RE: Steve Pena  [address] [facility] [address]        Dear Colleague,    Thank you for referring your patient, Steve Pena, to the [facility] [place]. Please see a copy of my visit note below.    Steve Pena  :  1980  DOS: 2023  MRN: [id number]  PCP: No Ref-Primary, Physician    Sports Medicine Clinic Visit      HPI  Steve Pena is a 42 year old female who is seen as a self referral presenting with left wrist and left knee pain.    - Mechanism of Injury:  No inciting injury.   - Prior evaluation:   - UC on 2023 for approximately 1 week of 8/10 severity left knee pain, no redness/bruising/swelling/grinding, no numbness/tingling/weakness.  Pain with weightbearing and walking, and leg pain.  X-rays showed a small knee joint effusion with otherwise normal anatomic alignment and no acute fracture or dislocation, no significant degenerative changes.  Diagnosed with a likely sprain, prescribed a short course of prednisone and recommended physical therapy if symptoms persist.  -No info or images on left wrist.    - Pain Character:  Pain has been present for  1-2 months .  Pain is well localized to the anterior knee without significant radiation.   - Endorses: Painful clicking, popping, mechanical locking symptoms occasionally, especially with deep squatting and pivoting/twisting.  Also endorses instability occasionally when walking.  Endorses swelling.  - Denies:  numbness, tingling, radicular shooting pain, weakness.  - Alleviating factors:   prednisone, heating  diclofenac and icy hot  - Aggravating factors:  getting in & out of chairs, deep squatting, pivoting/twisting  - Treatments tried:    prednisone, heating  diclofenac and icy hot    - Patient Goals:  get a formal diagnosis, be able to manage the symptoms successfully, discuss treatment options  - Social History: currently employed as CNA    - Pertinent PMH:  T2DM      Review of  Systems  Musculoskeletal: as above  Remainder of review of systems is negative including constitutional, CV, pulmonary, GI, Skin and Neurologic except as noted in HPI or medical history.    Past Medical History:   Diagnosis Date     No pertinent past medical history      Past Surgical History:   Procedure Laterality Date     NO HISTORY OF SURGERY       No family history on file.      Objective  There were no vitals taken for this visit.    General: healthy, alert and in no acute distress.    HEENT: no scleral icterus or conjunctival erythema.   Skin: no suspicious lesions or rash. No jaundice.   CV: regular rhythm by palpation, 2+ distal pulses.  Resp: normal respiratory effort without conversational dyspnea.   Psych: normal mood and affect.    Gait: nonantalgic, appropriate coordination and balance.     Neuro:        - Sensation to light touch:    - Intact throughout the BLE including all peripheral nerve distributions.     MSK - Knee:       - Inspection:    -Mild effusion present on the left without surrounding erythema, warmth, ecchymosis, lesion.       - ROM:    - Full AROM/PROM with anterior and posterior knee pain during terminal knee flexion.  Also has a feeling of stiffness and tightness throughout knee flexion.       - Palpation:    - TTP at the medial joint line, lateral joint line, slightly in the popliteal fossa.   - NTTP elsewhere.        - Strength:  (*antalgic)  RLE LLE  - Hip Flexion  5 5   - Hip Extension 5 5   - Hip Abduction 5 5   - Hip Adduction 5 5  - Knee Flexion  5 5  - Knee Extension 5 5  - Dorsiflexion  5 5  - Plantarflexion 5 5  - Ext. Reji. Longus 5 5  - Inversion  5 5  - Eversion  5 5       - Special tests:        - Lachman: Equivocal/questionable due to guarding, but likely a solid endpoint is present.        - A/P drawer:  Neg       - Pivot shift:  Neg   - Marlys: Positive for lateral compartment click and pain     - Varus stress:  Neg for laxity or pain     - Valgus stress:  Neg for  laxity or pain    - Patellar grind:  Neg    - Thessaly: Positive for lateral compartment click and pain       - Functional tests:   - Deep squat:  Able to perform, caused a feeling of lateral compartment click/lock and pain    Radiology  I independently reviewed the available relevant imaging, with the following interpretation:   -XR with interpretation as above in HPI.      Assessment  1. Tear of lateral meniscus of left knee, current, unspecified tear type, initial encounter        Plan  Steve Pena is a 42 year old female that presents with subacute left knee pain with swelling, clicking, mechanical locking symptoms, and anterior and posterior knee pain especially with deep squatting and pivoting/twisting maneuvers.  Positive Marlys's and Thessaly's for lateral compartment pain and click that raises concern for a lateral meniscus tear.  Questionable Lachman due to guarding, no specific injury that would have led to an acute ACL tear but keeping on the differential based on symptoms.  History and physical exam appear most consistent with an acute tear of the lateral meniscus.  Symptoms may also be attributable to patellofemoral pain syndrome if no organic pathology is identified on the MRI.    Discussed the nature of the condition and treatment options and mutually agreed upon the following plan:    - Imaging:          - Reviewed relevant imaging in the chart.  - Reviewed results and images with patient.   - Medications:          - Discussed pharmacologic options for pain relief.   - May use NSAIDs (Ibuprofen, Naproxen) or Acetaminophen (Tylenol) as needed for pain control.   - May also use topical medications such as lidocaine, IcyHot, BioFreeze, or Voltaren gel as needed for pain control.   - Injections:          - Discussed possible injection options and alternatives.    - Options may include intra-articular knee joint injections with corticosteroid, viscosupplementation, or PRP.    - Deferred injections  today in favor of obtaining the MRI first and will consider them in the future as needed.   - Therapy:          - Discussed the benefits of physical therapy vs home exercise program for optimization of range of motion, flexibility, strength, stability and function.   -We will consider and develop a formalized therapy plan, depending on the results of the MRI.  - Modalities:          - May use ice, heat, massage or other modalities as needed.   - Bracing:          - Discussed bracing options and recommend using your home knee stability brace for now when walking and performing deep squatting, stairs, other activities that cause symptoms.    - Surgery:          - Discussed non-operative and operative treatment options for the patient's condition.   -Goal will be for conservative management unless surgery is indicated based on the results of the MRI.  - Activity:          - Encouraged to remain active and participate in regular activities as symptoms allow.    - Follow up:          -When results of the MRI are obtained to discuss the results, for re-evaluation and update to treatment plan, or sooner for new/worsening symptoms.  - Patient has clinic contact information for questions or concerns.       Nabil Tello DO, CAQSM  [facility] Medicine  [facility] - Department of Orthopedic Surgery       Disclaimer:  This note was prepared and written using Dragon Medical dictation software. As a result, there may be errors in the script that have gone undetected. Please consider this when interpreting the information in this note.       Again, thank you for allowing me to participate in the care of your patient.        Sincerely,        Nabil Tello DO

## 2023-09-21 ENCOUNTER — MYC MEDICAL ADVICE (OUTPATIENT)
Dept: ENDOCRINOLOGY | Facility: CLINIC | Age: 43
End: 2023-09-21
Payer: COMMERCIAL

## 2023-09-21 DIAGNOSIS — E11.9 TYPE 2 DIABETES MELLITUS WITHOUT COMPLICATION, WITHOUT LONG-TERM CURRENT USE OF INSULIN (H): Primary | ICD-10-CM

## 2023-09-26 DIAGNOSIS — E11.9 TYPE 2 DIABETES MELLITUS WITHOUT COMPLICATION, WITHOUT LONG-TERM CURRENT USE OF INSULIN (H): Primary | ICD-10-CM

## 2023-09-26 RX ORDER — ACYCLOVIR 400 MG/1
TABLET ORAL
Qty: 3 EACH | Refills: 5 | Status: SHIPPED | OUTPATIENT
Start: 2023-09-26

## 2023-09-27 ENCOUNTER — ANCILLARY PROCEDURE (OUTPATIENT)
Dept: MRI IMAGING | Facility: CLINIC | Age: 43
End: 2023-09-27
Attending: STUDENT IN AN ORGANIZED HEALTH CARE EDUCATION/TRAINING PROGRAM
Payer: COMMERCIAL

## 2023-09-27 DIAGNOSIS — S83.282A TEAR OF LATERAL MENISCUS OF LEFT KNEE, CURRENT, UNSPECIFIED TEAR TYPE, INITIAL ENCOUNTER: ICD-10-CM

## 2023-09-27 PROCEDURE — 73721 MRI JNT OF LWR EXTRE W/O DYE: CPT | Mod: LT | Performed by: RADIOLOGY

## 2023-09-28 NOTE — PROGRESS NOTES
Steve Pena  :  1980  DOS: 2023  MRN: [id number]  PCP: No Ref-Primary, Physician    Sports Medicine Clinic Visit    Interim History - 2023  - Last seen in clinic on 2023 for left lateral meniscus tear and left knee pain. Conservative measures discussed until MRI results come back.  - MRI results on file. Looking to discuss today.  - Since the last visit, she notes that her left anterior knee pain has gotten a little better. Has been using Tylenol and Voltaren gel for pain relief. Walking and movement after prolonged sitting are the most bothersome.   - No interim injury.     - MR L knee 2023 shows evidence of a horizontal tear of the posterior horn of the medial meniscus.  Also showed mid fiber changes of the ACL indicating a chronic healed mid substance ACL injury.  Also with associated MCL changes from a remote injury.  Small joint effusion present.  Also showed full-thickness chondral fissuring with subchondral edema in the median ridge of the patella.      Initial Visit: 2023  HPI  Steve Pena is a 42 year old female who is seen as a self referral presenting with left wrist and left knee pain.    - Mechanism of Injury:  No inciting injury.   - Prior evaluation:   - UC on 2023 for approximately 1 week of 8/10 severity left knee pain, no redness/bruising/swelling/grinding, no numbness/tingling/weakness.  Pain with weightbearing and walking, and leg pain.  X-rays showed a small knee joint effusion with otherwise normal anatomic alignment and no acute fracture or dislocation, no significant degenerative changes.  Diagnosed with a likely sprain, prescribed a short course of prednisone and recommended physical therapy if symptoms persist.  -No info or images on left wrist.    - Pain Character:  Pain has been present for  1-2 months .  Pain is well localized to the anterior knee without significant radiation.   - Endorses: Painful clicking, popping, mechanical  "locking symptoms occasionally, especially with deep squatting and pivoting/twisting.  Also endorses instability occasionally when walking.  Endorses swelling.  - Denies:  numbness, tingling, radicular shooting pain, weakness.  - Alleviating factors:   prednisone, heating  diclofenac and icy hot  - Aggravating factors:  getting in & out of chairs, deep squatting, pivoting/twisting  - Treatments tried:    prednisone, heating  diclofenac and icy hot    - Patient Goals:  get a formal diagnosis, be able to manage the symptoms successfully, discuss treatment options  - Social History: currently employed as CNA    - Pertinent PMH:  T2DM      Review of Systems  Musculoskeletal: as above  Remainder of review of systems is negative including constitutional, CV, pulmonary, GI, Skin and Neurologic except as noted in HPI or medical history.    Past Medical History:   Diagnosis Date    No pertinent past medical history      Past Surgical History:   Procedure Laterality Date    NO HISTORY OF SURGERY       No family history on file.      Objective  Ht 1.575 m (5' 2\")   Wt 96 kg (211 lb 9.6 oz)   BMI 38.70 kg/m      General: healthy, alert and in no acute distress.    HEENT: no scleral icterus or conjunctival erythema.   Skin: no suspicious lesions or rash. No jaundice.   CV: regular rhythm by palpation, 2+ distal pulses.  Resp: normal respiratory effort without conversational dyspnea.   Psych: normal mood and affect.    Gait: antalgic favoring the left leg, appropriate coordination and balance.     Neuro:        - Sensation to light touch:    - Intact throughout the BLE including all peripheral nerve distributions.     MSK - Knee:       - Inspection:    -Mild effusion present on the left without surrounding erythema, warmth, ecchymosis, lesion.       - ROM:    - Full AROM/PROM with anterior and posterior knee pain during terminal knee flexion.  Also has a feeling of stiffness and tightness throughout knee flexion which is less " than prior exam.       - Palpation:    - TTP at the medial joint line, lateral joint line, slightly in the popliteal fossa.   - NTTP elsewhere.        - Strength:  (*antalgic)  RLE LLE  - Hip Flexion  5 5   - Hip Extension 5 5   - Hip Abduction 5 5   - Hip Adduction 5 5  - Knee Flexion  5 5  - Knee Extension 5 5  - Dorsiflexion  5 5  - Plantarflexion 5 5  - Ext. Reji. Longus 5 5  - Inversion  5 5  - Eversion  5 5       - Special tests:        - Lachman:  Solid endpoint with increased laxity compared to the contralateral side        - A/P drawer:  Neg       - Pivot shift:  Neg   - Marlys: Positive for medial compartment click and pain     - Varus stress:  Neg for laxity or pain     - Valgus stress:  Neg for laxity or pain    - Patellar grind:  Neg    - Thessaly: Positive for medial compartment click and pain    Radiology  I independently reviewed the available relevant imaging, with the following interpretation:   -XR and MRI with interpretation as above in HPI.      Procedure  Large Joint Injection/Arthocentesis: L knee joint    Date/Time: 9/29/2023 10:20 AM    Performed by: Nabil Tello DO  Authorized by: Nabil Tello DO    Indications:  Pain  Needle Size:  22 G  Guidance: landmark guided    Approach:  Anterolateral  Location:  Knee      Medications:  40 mg triamcinolone 40 MG/ML; 2 mL lidocaine 1 %; 2 mL BUPivacaine 0.5 %  Outcome:  Tolerated well, no immediate complications  Procedure discussed: discussed risks, benefits, and alternatives    Consent Given by:  Patient  Prep: patient was prepped and draped in usual sterile fashion         PROCEDURE  Intraarticular Knee Joint Injection - Left  The patient was informed of the risks and benefits of the procedure and alternatives were discussed. A written consent was signed by the patient.   The injection site was prepped with chlorhexidine in sterile fashion.   An injectate solution containing 2 mL of 1% lidocaine, 2 mL of 0.5% bupivacaine, and 1 mL of Kenalog  (40 mg/mL) was drawn up into a 5 mL syringe.  Injection was performed using sterile technique.  A 1.5-inch 22-gauge needle was used to enter the knee joint using a lateral infrapatellar approach and injectate was injected successfully. After the injection, the site was cleaned and a bandage applied.     The patient tolerated the procedure well without complications.       Assessment  1. Tear of lateral meniscus of left knee, current, unspecified tear type, initial encounter    2. Chronic rupture of ACL of left knee    3. Chondromalacia of left patella          Plan  Steve Pena is a 42 year old female that presents for follow-up of her subacute left knee pain with swelling, clicking, mechanical locking symptoms, and anterior and posterior knee pain especially with deep squatting and pivoting/twisting maneuvers.  Originally presented with positive Marlys's and Thessaly's for lateral compartment pain and click that raised concern for a meniscus tear.  Questionable Lachman due to guarding, no specific injury that would have led to an acute ACL tear but keeping on the differential based on symptoms.      Based on the history and exam above, an MRI was obtained to evaluate the integrity of the soft tissues of the knee, and the MRI revealed a horizontal tear of the medial meniscus with a chronic healed ACL rupture and chondromalacia patella.    Discussed the nature of the condition and treatment options and mutually agreed upon the following plan:    - Imaging:          - Reviewed relevant imaging in the chart.  - Reviewed results and images with patient.   - Medications:          - Discussed pharmacologic options for pain relief.   - May use NSAIDs (Ibuprofen, Naproxen) or Acetaminophen (Tylenol) as needed for pain control.   - May also use topical medications such as lidocaine, IcyHot, BioFreeze, or Voltaren gel as needed for pain control.   - Injections:          - Discussed possible injection options and  alternatives.    - Options may include intra-articular knee joint injections with corticosteroid, viscosupplementation, or PRP.    - Performed a corticosteroid injection of the left intra-articular knee joint today in clinic. Patient tolerated the procedure well without complications.   - Post-procedure instructions:    - Keep the injection site clean and dry.   - Do not submerge the injection site for 24 hours (no baths, pools). Showers are ok.   - Rest the area for 24-48 hours before resuming normal activities. Avoid overexerting the area for the first few weeks.   - It may take 2-3 days to start noticing the effects of the injection and up to 3-4 weeks to feel significant benefits.   - In general, an injection in the same anatomical region can be repeated every 3 months as needed.  However, for the health of your tissues you will want to space out the injections as much as possible and request them only when symptoms are significantly bothersome and disrupting daily function and/or sleep.   - Therapy:          - Discussed the benefits of physical therapy vs home exercise program for optimization of range of motion, flexibility, strength, stability and function.   - Preference was for a home exercise program, which was given today in clinic and instructed to perform daily and after exacerbations of pain.  - Modalities:          - May use ice, heat, massage or other modalities as needed.   - Bracing:          - Discussed bracing options and recommend using your home knee stability brace for now when walking and performing deep squatting, stairs, other activities that cause symptoms.  Also offered a more substantial hinged brace today in clinic which she may choose to take or continue with her home brace.  - Surgery:          - Discussed non-operative and operative treatment options for the patient's condition.   -Goal will continue to be to manage her symptoms conservatively and will consider surgical intervention  in the future if she fails conservative management.  - Activity:          - Encouraged to remain active and participate in regular activities as symptoms allow.    - Follow up:          -As needed in the future for re-evaluation and update to treatment plan, or sooner for new/worsening symptoms.  - Patient has clinic contact information for questions or concerns.       Nabil Tello DO, JESSICA  [facility] Sports Medicine  [facility] - Department of Orthopedic Surgery       Disclaimer:  This note was prepared and written using Dragon Medical dictation software. As a result, there may be errors in the script that have gone undetected. Please consider this when interpreting the information in this note.

## 2023-09-29 ENCOUNTER — OFFICE VISIT (OUTPATIENT)
Dept: ORTHOPEDICS | Facility: CLINIC | Age: 43
End: 2023-09-29
Payer: COMMERCIAL

## 2023-09-29 VITALS — HEIGHT: 62 IN | WEIGHT: 211.6 LBS | BODY MASS INDEX: 38.94 KG/M2

## 2023-09-29 DIAGNOSIS — M22.42 CHONDROMALACIA OF LEFT PATELLA: ICD-10-CM

## 2023-09-29 DIAGNOSIS — S83.512A CHRONIC RUPTURE OF ACL OF LEFT KNEE: Primary | ICD-10-CM

## 2023-09-29 DIAGNOSIS — S83.242A TEAR OF MEDIAL MENISCUS OF LEFT KNEE, CURRENT, UNSPECIFIED TEAR TYPE, INITIAL ENCOUNTER: ICD-10-CM

## 2023-09-29 PROCEDURE — 20610 DRAIN/INJ JOINT/BURSA W/O US: CPT | Mod: LT | Performed by: STUDENT IN AN ORGANIZED HEALTH CARE EDUCATION/TRAINING PROGRAM

## 2023-09-29 PROCEDURE — 99214 OFFICE O/P EST MOD 30 MIN: CPT | Mod: 25 | Performed by: STUDENT IN AN ORGANIZED HEALTH CARE EDUCATION/TRAINING PROGRAM

## 2023-09-29 RX ORDER — TRIAMCINOLONE ACETONIDE 40 MG/ML
40 INJECTION, SUSPENSION INTRA-ARTICULAR; INTRAMUSCULAR
Status: SHIPPED | OUTPATIENT
Start: 2023-09-29

## 2023-09-29 RX ORDER — LIDOCAINE HYDROCHLORIDE 10 MG/ML
2 INJECTION, SOLUTION INFILTRATION; PERINEURAL
Status: SHIPPED | OUTPATIENT
Start: 2023-09-29

## 2023-09-29 RX ORDER — BUPIVACAINE HYDROCHLORIDE 5 MG/ML
2 INJECTION, SOLUTION PERINEURAL
Status: SHIPPED | OUTPATIENT
Start: 2023-09-29

## 2023-09-29 RX ADMIN — LIDOCAINE HYDROCHLORIDE 2 ML: 10 INJECTION, SOLUTION INFILTRATION; PERINEURAL at 10:20

## 2023-09-29 RX ADMIN — TRIAMCINOLONE ACETONIDE 40 MG: 40 INJECTION, SUSPENSION INTRA-ARTICULAR; INTRAMUSCULAR at 10:20

## 2023-09-29 RX ADMIN — BUPIVACAINE HYDROCHLORIDE 2 ML: 5 INJECTION, SOLUTION PERINEURAL at 10:20

## 2023-09-29 ASSESSMENT — PAIN SCALES - GENERAL: PAINLEVEL: EXTREME PAIN (8)

## 2023-09-29 NOTE — LETTER
2023         RE: Steve Pena  [address] [facility] [address]        Dear Colleague,    Thank you for referring your patient, Steve Pena, to the [facility] [place]. Please see a copy of my visit note below.    Steve Pena  :  1980  DOS: 2023  MRN: [id number]  PCP: No Ref-Primary, Physician    Sports Medicine Clinic Visit    Interim History - 2023  - Last seen in clinic on 2023 for left lateral meniscus tear and left knee pain. Conservative measures discussed until MRI results come back.  - MRI results on file. Looking to discuss today.  - Since the last visit, she notes that her left anterior knee pain has gotten a little better. Has been using Tylenol and Voltaren gel for pain relief. Walking and movement after prolonged sitting are the most bothersome.   - No interim injury.     - MR L knee 2023 shows evidence of a horizontal tear of the posterior horn of the medial meniscus.  Also showed mid fiber changes of the ACL indicating a chronic healed mid substance ACL injury.  Also with associated MCL changes from a remote injury.  Small joint effusion present.  Also showed full-thickness chondral fissuring with subchondral edema in the median ridge of the patella.      Initial Visit: 2023  HPI  Steve Pena is a 42 year old female who is seen as a self referral presenting with left wrist and left knee pain.    - Mechanism of Injury:  No inciting injury.   - Prior evaluation:   - UC on 2023 for approximately 1 week of 8/10 severity left knee pain, no redness/bruising/swelling/grinding, no numbness/tingling/weakness.  Pain with weightbearing and walking, and leg pain.  X-rays showed a small knee joint effusion with otherwise normal anatomic alignment and no acute fracture or dislocation, no significant degenerative changes.  Diagnosed with a likely sprain, prescribed a short course of prednisone and recommended  "physical therapy if symptoms persist.  -No info or images on left wrist.    - Pain Character:  Pain has been present for  1-2 months .  Pain is well localized to the anterior knee without significant radiation.   - Endorses: Painful clicking, popping, mechanical locking symptoms occasionally, especially with deep squatting and pivoting/twisting.  Also endorses instability occasionally when walking.  Endorses swelling.  - Denies:  numbness, tingling, radicular shooting pain, weakness.  - Alleviating factors:   prednisone, heating  diclofenac and icy hot  - Aggravating factors:  getting in & out of chairs, deep squatting, pivoting/twisting  - Treatments tried:    prednisone, heating  diclofenac and icy hot    - Patient Goals:  get a formal diagnosis, be able to manage the symptoms successfully, discuss treatment options  - Social History: currently employed as CNA    - Pertinent PMH:  T2DM      Review of Systems  Musculoskeletal: as above  Remainder of review of systems is negative including constitutional, CV, pulmonary, GI, Skin and Neurologic except as noted in HPI or medical history.    Past Medical History:   Diagnosis Date     No pertinent past medical history      Past Surgical History:   Procedure Laterality Date     NO HISTORY OF SURGERY       No family history on file.      Objective  Ht 1.575 m (5' 2\")   Wt 96 kg (211 lb 9.6 oz)   BMI 38.70 kg/m      General: healthy, alert and in no acute distress.    HEENT: no scleral icterus or conjunctival erythema.   Skin: no suspicious lesions or rash. No jaundice.   CV: regular rhythm by palpation, 2+ distal pulses.  Resp: normal respiratory effort without conversational dyspnea.   Psych: normal mood and affect.    Gait: antalgic favoring the left leg, appropriate coordination and balance.     Neuro:        - Sensation to light touch:    - Intact throughout the BLE including all peripheral nerve distributions.     MSK - Knee:       - Inspection:    -Mild effusion " present on the left without surrounding erythema, warmth, ecchymosis, lesion.       - ROM:    - Full AROM/PROM with anterior and posterior knee pain during terminal knee flexion.  Also has a feeling of stiffness and tightness throughout knee flexion which is less than prior exam.       - Palpation:    - TTP at the medial joint line, lateral joint line, slightly in the popliteal fossa.   - NTTP elsewhere.        - Strength:  (*antalgic)  RLE LLE  - Hip Flexion  5 5   - Hip Extension 5 5   - Hip Abduction 5 5   - Hip Adduction 5 5  - Knee Flexion  5 5  - Knee Extension 5 5  - Dorsiflexion  5 5  - Plantarflexion 5 5  - Ext. Reji. Longus 5 5  - Inversion  5 5  - Eversion  5 5       - Special tests:        - Lachman:  Solid endpoint with increased laxity compared to the contralateral side        - A/P drawer:  Neg       - Pivot shift:  Neg   - Marlys: Positive for medial compartment click and pain     - Varus stress:  Neg for laxity or pain     - Valgus stress:  Neg for laxity or pain    - Patellar grind:  Neg    - Thessaly: Positive for medial compartment click and pain    Radiology  I independently reviewed the available relevant imaging, with the following interpretation:   -XR and MRI with interpretation as above in HPI.      Procedure  Large Joint Injection/Arthocentesis: L knee joint    Date/Time: 9/29/2023 10:20 AM    Performed by: Nabil Tello DO  Authorized by: Nabil Tello DO    Indications:  Pain  Needle Size:  22 G  Guidance: landmark guided    Approach:  Anterolateral  Location:  Knee      Medications:  40 mg triamcinolone 40 MG/ML; 2 mL lidocaine 1 %; 2 mL BUPivacaine 0.5 %  Outcome:  Tolerated well, no immediate complications  Procedure discussed: discussed risks, benefits, and alternatives    Consent Given by:  Patient  Prep: patient was prepped and draped in usual sterile fashion         PROCEDURE  Intraarticular Knee Joint Injection - Left  The patient was informed of the risks and benefits of  the procedure and alternatives were discussed. A written consent was signed by the patient.   The injection site was prepped with chlorhexidine in sterile fashion.   An injectate solution containing 2 mL of 1% lidocaine, 2 mL of 0.5% bupivacaine, and 1 mL of Kenalog (40 mg/mL) was drawn up into a 5 mL syringe.  Injection was performed using sterile technique.  A 1.5-inch 22-gauge needle was used to enter the knee joint using a lateral infrapatellar approach and injectate was injected successfully. After the injection, the site was cleaned and a bandage applied.     The patient tolerated the procedure well without complications.       Assessment  1. Tear of lateral meniscus of left knee, current, unspecified tear type, initial encounter    2. Chronic rupture of ACL of left knee    3. Chondromalacia of left patella          Plan  Steve Pena is a 42 year old female that presents for follow-up of her subacute left knee pain with swelling, clicking, mechanical locking symptoms, and anterior and posterior knee pain especially with deep squatting and pivoting/twisting maneuvers.  Originally presented with positive Marlys's and Thessaly's for lateral compartment pain and click that raised concern for a meniscus tear.  Questionable Lachman due to guarding, no specific injury that would have led to an acute ACL tear but keeping on the differential based on symptoms.      Based on the history and exam above, an MRI was obtained to evaluate the integrity of the soft tissues of the knee, and the MRI revealed a horizontal tear of the medial meniscus with a chronic healed ACL rupture and chondromalacia patella.    Discussed the nature of the condition and treatment options and mutually agreed upon the following plan:    - Imaging:          - Reviewed relevant imaging in the chart.  - Reviewed results and images with patient.   - Medications:          - Discussed pharmacologic options for pain relief.   - May use NSAIDs  (Ibuprofen, Naproxen) or Acetaminophen (Tylenol) as needed for pain control.   - May also use topical medications such as lidocaine, IcyHot, BioFreeze, or Voltaren gel as needed for pain control.   - Injections:          - Discussed possible injection options and alternatives.    - Options may include intra-articular knee joint injections with corticosteroid, viscosupplementation, or PRP.    - Performed a corticosteroid injection of the left intra-articular knee joint today in clinic. Patient tolerated the procedure well without complications.   - Post-procedure instructions:    - Keep the injection site clean and dry.   - Do not submerge the injection site for 24 hours (no baths, pools). Showers are ok.   - Rest the area for 24-48 hours before resuming normal activities. Avoid overexerting the area for the first few weeks.   - It may take 2-3 days to start noticing the effects of the injection and up to 3-4 weeks to feel significant benefits.   - In general, an injection in the same anatomical region can be repeated every 3 months as needed.  However, for the health of your tissues you will want to space out the injections as much as possible and request them only when symptoms are significantly bothersome and disrupting daily function and/or sleep.   - Therapy:          - Discussed the benefits of physical therapy vs home exercise program for optimization of range of motion, flexibility, strength, stability and function.   - Preference was for a home exercise program, which was given today in clinic and instructed to perform daily and after exacerbations of pain.  - Modalities:          - May use ice, heat, massage or other modalities as needed.   - Bracing:          - Discussed bracing options and recommend using your home knee stability brace for now when walking and performing deep squatting, stairs, other activities that cause symptoms.  Also offered a more substantial hinged brace today in clinic which she  may choose to take or continue with her home brace.  - Surgery:          - Discussed non-operative and operative treatment options for the patient's condition.   -Goal will continue to be to manage her symptoms conservatively and will consider surgical intervention in the future if she fails conservative management.  - Activity:          - Encouraged to remain active and participate in regular activities as symptoms allow.    - Follow up:          -As needed in the future for re-evaluation and update to treatment plan, or sooner for new/worsening symptoms.  - Patient has clinic contact information for questions or concerns.       Nabil Tello DO, [facility]  [facility] - Department of Orthopedic Surgery       Disclaimer:  This note was prepared and written using Dragon Medical dictation software. As a result, there may be errors in the script that have gone undetected. Please consider this when interpreting the information in this note.       Again, thank you for allowing me to participate in the care of your patient.        Sincerely,        Nabil Tello DO

## 2023-10-02 RX ORDER — ACYCLOVIR 400 MG/1
TABLET ORAL
Qty: 1 EACH | Refills: 0 | Status: SHIPPED | OUTPATIENT
Start: 2023-10-02

## 2023-10-12 NOTE — PROGRESS NOTES
Steve Pena  :  1980  DOS: 10/13/2023  MRN: [id number]  PCP: No Ref-Primary, Physician    Sports Medicine Clinic Visit      HPI  Steve Pena is a 42 year old female who is seen as a self referral presenting with right ring finger triggering    - Mechanism of Injury:  No inciting injury.   - Prior evaluation:  2022 with [facility]uch[facility] Smalls: Right ring finger triggering. Injection performed, which helped a little. Interested in trying ultrasound-guided trigger finger injection..    - Pain Character:  Pain has been present for  over a year .  Pain is well localized to the base of the right finger on the volar side without significant radiation.   - Endorses:   pain , mechanical locking/triggering  - Denies:  swelling, instability, numbness, tingling, radicular shooting pain, weakness.   - Alleviating factors: Rest, activity modifications  - Aggravating factors:   finger flexion, extension  - Treatments tried:  corticosteroid injection last year that offered minimal relief.    - Patient Goals:  discuss treatment options  - Social History: CNA      Review of Systems  Musculoskeletal: as above  Remainder of review of systems is negative including constitutional, CV, pulmonary, GI, Skin and Neurologic except as noted in HPI or medical history.    Past Medical History:   Diagnosis Date    No pertinent past medical history      Past Surgical History:   Procedure Laterality Date    NO HISTORY OF SURGERY       No family history on file.      Objective  /77   Wt 96 kg (211 lb 9.6 oz)   BMI 38.70 kg/m      General: healthy, alert and in no acute distress.    HEENT: no scleral icterus or conjunctival erythema.   Skin: no suspicious lesions or rash. No jaundice.   CV: regular rhythm by palpation, 2+ distal pulses.  Resp: normal respiratory effort without conversational dyspnea.   Psych: normal mood and affect.    Gait: nonantalgic, appropriate coordination and balance.     Neuro:        - Sensation to  light touch:    - Intact throughout the BUE including all peripheral nerve distributions.     MSK - Wrist/Hand:       - Inspection:    - No significant swelling, erythema, warmth, ecchymosis, lesion, or atrophy noted.        - ROM:    - Full AROM/PROM with clicking/triggering and mild pain at the A1 pulley with right ring finger flexion/extension.       - Palpation:    - TTP at the right ring finger MCP/A1 pulley.   - NTTP elsewhere.        - Strength:  (*antalgic)  RUE LUE  - Shoulder Abduction   5 5   - Shoulder Flexion   5 5   - Shoulder Internal Rotation  5 5   - Shoulder External Rotation  5 5  - Elbow Flexion   5 5  - Elbow Extension   5 5  - Forearm Pronation   5 5  - Forearm Supination   5 5  - Wrist Extension   5 5  - Wrist Flexion    5 5  - FDI     5 5  - ADM     5 5  - FPL     5 5  - APB     5 5  - EIP     5 5  - EDC     5 5  - APL/EPB    5 5           - Special tests:        - Triggering: Positive in the right ring finger      Radiology  I independently reviewed the available relevant imaging, with the following interpretation:   -XR 4/19/2023 shows normal anatomic alignment without significant degenerative changes, no fractures or dislocations or other osseous abnormalities.    Procedure  Hand / Upper Extremity Injection/Arthrocentesis: R ring A1    Date/Time: 10/13/2023 2:23 PM    Performed by: Nabil Tello DO  Authorized by: Nabil Tello DO    Indications:  Pain and tendon swelling  Needle Size:  27 G  Guidance: ultrasound    Approach:  Volar  Condition: trigger finger    Location:  Ring finger    Site:  R ring A1  Medications:  20 mg triamcinolone 40 MG/ML; 0.5 mL BUPivacaine 0.5 %  Outcome:  Tolerated well, no immediate complications  Procedure discussed: discussed risks, benefits, and alternatives    Consent Given by:  Patient  Prep: patient was prepped and draped in usual sterile fashion      Procedure: Trigger Finger Injection - right ring finger  Consent given, and signed on chart.   Sterile prepping.    Ultrasound identification of flexor tendons and A1 pulley on both long and short axis.    The probe was placed in short axis view to the flexor digitorum tendons at the A1 pulley at the level of the right fourth digit metacarpophalangeal joint.  A 1.5 inch 27 gauge needle was placed under ultrasound guidance between the flexor tendon sheath and the flexor digitorum tendons.     A mixture of 1 cc 1% xylocaine and 0.5 cc 40 mg kenalog was injected without difficulty on the short axis view.    Good hemostasis and no complications.  Ultrasound documentation of needle placement and injection was acquired and images were permanently stored to the record.       Assessment  1. Trigger finger, acquired        Plan  Steve Pena is a 42 year old female that presents with right ring finger clicking/triggering.  History of trigger finger in this finger and tried a landmark guided corticosteroid injection last year which was only minimally helpful.  Discussed interventional and conservative options for treatment and she would like to try an ultrasound-guided trigger finger corticosteroid injection.    Discussed the nature of the condition and treatment options and mutually agreed upon the following plan:    - Medications:          - Discussed pharmacologic options for pain relief.   - May use NSAIDs (Ibuprofen, Naproxen) or Acetaminophen (Tylenol) as needed for pain control.   - May also use topical medications such as lidocaine, IcyHot, BioFreeze, or Voltaren gel as needed for pain control.   - Injections:          - Discussed possible injection options and alternatives.    - Options include corticosteroid injection of the right ring finger A1 pulley/flexor tendon sheath.   - Performed a corticosteroid injection of the right ring finger A1 pulley/flexor tendon sheath today in clinic. Patient tolerated the procedure well without complications.    - Post-procedure instructions:    - Keep the injection site  clean and dry.   - Do not submerge the injection site for 24 hours (no baths, pools). Showers are ok.   - Rest the area for 24-48 hours before resuming normal activities. Avoid overexerting the area for the first few weeks.   - It may take 2-3 days to start noticing the effects of the injection and up to 3-4 weeks to feel significant benefits.   - Therapy:          - Discussed the benefits of physical therapy vs home exercise program for optimization of range of motion, flexibility, strength, stability and function.   - Preference is for a home exercise program.   - Home Exercise Program given today in clinic and recommendation given to perform HEP daily and after exacerbations.  - Modalities:          - May use ice, heat, massage or other modalities as needed.   - Bracing:          - Discussed bracing options and recommend using trigger finger extension splinting at night and during exacerbating activities as needed.    - Surgery:          - Discussed non-operative and operative treatment options for the patient's condition.   -May consider trigger finger release procedure if injection and other conservative care is not helpful.  - Activity:          - Encouraged to remain active and participate in regular activities as symptoms allow.  Avoid exacerbating activities.   - Follow up:          -As needed in the future for re-evaluation and update to treatment plan, or sooner for new/worsening symptoms.  - Patient has clinic contact information for questions or concerns.       Nabil Tello DO, JESSICA  [facility] - Sports Medicine  [facility] - Department of Orthopedic Surgery       Disclaimer:  This note was prepared and written using Dragon Medical dictation software. As a result, there may be errors in the script that have gone undetected. Please consider this when interpreting the information in this note.

## 2023-10-13 ENCOUNTER — OFFICE VISIT (OUTPATIENT)
Dept: ORTHOPEDICS | Facility: CLINIC | Age: 43
End: 2023-10-13
Payer: COMMERCIAL

## 2023-10-13 VITALS — WEIGHT: 211.6 LBS | BODY MASS INDEX: 38.7 KG/M2 | SYSTOLIC BLOOD PRESSURE: 127 MMHG | DIASTOLIC BLOOD PRESSURE: 77 MMHG

## 2023-10-13 DIAGNOSIS — M65.30 TRIGGER FINGER, ACQUIRED: Primary | ICD-10-CM

## 2023-10-13 PROCEDURE — 76942 ECHO GUIDE FOR BIOPSY: CPT | Performed by: STUDENT IN AN ORGANIZED HEALTH CARE EDUCATION/TRAINING PROGRAM

## 2023-10-13 PROCEDURE — 20550 NJX 1 TENDON SHEATH/LIGAMENT: CPT | Mod: F8 | Performed by: STUDENT IN AN ORGANIZED HEALTH CARE EDUCATION/TRAINING PROGRAM

## 2023-10-13 PROCEDURE — 99213 OFFICE O/P EST LOW 20 MIN: CPT | Mod: 25 | Performed by: STUDENT IN AN ORGANIZED HEALTH CARE EDUCATION/TRAINING PROGRAM

## 2023-10-13 RX ADMIN — TRIAMCINOLONE ACETONIDE 20 MG: 40 INJECTION, SUSPENSION INTRA-ARTICULAR; INTRAMUSCULAR at 14:23

## 2023-10-13 RX ADMIN — BUPIVACAINE HYDROCHLORIDE 0.5 ML: 5 INJECTION, SOLUTION PERINEURAL at 14:23

## 2023-10-13 ASSESSMENT — PAIN SCALES - GENERAL: PAINLEVEL: MILD PAIN (2)

## 2023-10-13 NOTE — LETTER
10/13/2023         RE: Steve Pena  [address] [facility] [address] [facility] [address]        Dear Colleague,    Thank you for referring your patient, Steve Pena, to the [facility] [place]. Please see a copy of my visit note below.    Steve Pena  :  1980  DOS: 10/13/2023  MRN: [id number]  PCP: No Ref-Primary, Physician    Sports Medicine Clinic Visit      HPI  Steve Pena is a 42 year old female who is seen as a self referral presenting with right ring finger triggering    - Mechanism of Injury:  No inciting injury.   - Prior evaluation:  2022 with [facility]ucho Smalls: Right ring finger triggering. Injection performed, which helped a little. Interested in trying ultrasound-guided trigger finger injection..    - Pain Character:  Pain has been present for  over a year .  Pain is well localized to the base of the right finger on the volar side without significant radiation.   - Endorses:   pain , mechanical locking/triggering  - Denies:  swelling, instability, numbness, tingling, radicular shooting pain, weakness.   - Alleviating factors: Rest, activity modifications  - Aggravating factors:   finger flexion, extension  - Treatments tried:  corticosteroid injection last year that offered minimal relief.    - Patient Goals:  discuss treatment options  - Social History: CNA      Review of Systems  Musculoskeletal: as above  Remainder of review of systems is negative including constitutional, CV, pulmonary, GI, Skin and Neurologic except as noted in HPI or medical history.    Past Medical History:   Diagnosis Date     No pertinent past medical history      Past Surgical History:   Procedure Laterality Date     NO HISTORY OF SURGERY       No family history on file.      Objective  /77   Wt 96 kg (211 lb 9.6 oz)   BMI 38.70 kg/m      General: healthy, alert and in no acute distress.    HEENT: no scleral icterus or conjunctival erythema.   Skin: no suspicious lesions or  rash. No jaundice.   CV: regular rhythm by palpation, 2+ distal pulses.  Resp: normal respiratory effort without conversational dyspnea.   Psych: normal mood and affect.    Gait: nonantalgic, appropriate coordination and balance.     Neuro:        - Sensation to light touch:    - Intact throughout the BUE including all peripheral nerve distributions.     MSK - Wrist/Hand:       - Inspection:    - No significant swelling, erythema, warmth, ecchymosis, lesion, or atrophy noted.        - ROM:    - Full AROM/PROM with clicking/triggering and mild pain at the A1 pulley with right ring finger flexion/extension.       - Palpation:    - TTP at the right ring finger MCP/A1 pulley.   - NTTP elsewhere.        - Strength:  (*antalgic)  RUE LUE  - Shoulder Abduction   5 5   - Shoulder Flexion   5 5   - Shoulder Internal Rotation  5 5   - Shoulder External Rotation  5 5  - Elbow Flexion   5 5  - Elbow Extension   5 5  - Forearm Pronation   5 5  - Forearm Supination   5 5  - Wrist Extension   5 5  - Wrist Flexion    5 5  - FDI     5 5  - ADM     5 5  - FPL     5 5  - APB     5 5  - EIP     5 5  - EDC     5 5  - APL/EPB    5 5           - Special tests:        - Triggering: Positive in the right ring finger      Radiology  I independently reviewed the available relevant imaging, with the following interpretation:   -XR 4/19/2023 shows normal anatomic alignment without significant degenerative changes, no fractures or dislocations or other osseous abnormalities.    Procedure  Hand / Upper Extremity Injection/Arthrocentesis: R ring A1    Date/Time: 10/13/2023 2:23 PM    Performed by: Nabil Tello DO  Authorized by: Nabil Tello DO    Indications:  Pain and tendon swelling  Needle Size:  27 G  Guidance: ultrasound    Approach:  Volar  Condition: trigger finger    Location:  Ring finger    Site:  R ring A1  Medications:  20 mg triamcinolone 40 MG/ML; 0.5 mL BUPivacaine 0.5 %  Outcome:  Tolerated well, no immediate  complications  Procedure discussed: discussed risks, benefits, and alternatives    Consent Given by:  Patient  Prep: patient was prepped and draped in usual sterile fashion      Procedure: Trigger Finger Injection - right ring finger  Consent given, and signed on chart.  Sterile prepping.    Ultrasound identification of flexor tendons and A1 pulley on both long and short axis.    The probe was placed in short axis view to the flexor digitorum tendons at the A1 pulley at the level of the right fourth digit metacarpophalangeal joint.  A 1.5 inch 27 gauge needle was placed under ultrasound guidance between the flexor tendon sheath and the flexor digitorum tendons.     A mixture of 1 cc 1% xylocaine and 0.5 cc 40 mg kenalog was injected without difficulty on the short axis view.    Good hemostasis and no complications.  Ultrasound documentation of needle placement and injection.     Assessment  1. Trigger finger, acquired        Plan  Steve Pena is a 42 year old female that presents with right ring finger clicking/triggering.  History of trigger finger in this finger and tried a landmark guided corticosteroid injection last year which was only minimally helpful.  Discussed interventional and conservative options for treatment and she would like to try an ultrasound-guided trigger finger corticosteroid injection.    Discussed the nature of the condition and treatment options and mutually agreed upon the following plan:    - Medications:          - Discussed pharmacologic options for pain relief.   - May use NSAIDs (Ibuprofen, Naproxen) or Acetaminophen (Tylenol) as needed for pain control.   - May also use topical medications such as lidocaine, IcyHot, BioFreeze, or Voltaren gel as needed for pain control.   - Injections:          - Discussed possible injection options and alternatives.    - Options include corticosteroid injection of the right ring finger A1 pulley/flexor tendon sheath.   - Performed a corticosteroid  injection of the right ring finger A1 pulley/flexor tendon sheath today in clinic. Patient tolerated the procedure well without complications.    - Post-procedure instructions:    - Keep the injection site clean and dry.   - Do not submerge the injection site for 24 hours (no baths, pools). Showers are ok.   - Rest the area for 24-48 hours before resuming normal activities. Avoid overexerting the area for the first few weeks.   - It may take 2-3 days to start noticing the effects of the injection and up to 3-4 weeks to feel significant benefits.   - Therapy:          - Discussed the benefits of physical therapy vs home exercise program for optimization of range of motion, flexibility, strength, stability and function.   - Preference is for a home exercise program.   - Home Exercise Program given today in clinic and recommendation given to perform HEP daily and after exacerbations.  - Modalities:          - May use ice, heat, massage or other modalities as needed.   - Bracing:          - Discussed bracing options and recommend using trigger finger extension splinting at night and during exacerbating activities as needed.    - Surgery:          - Discussed non-operative and operative treatment options for the patient's condition.   -May consider trigger finger release procedure if injection and other conservative care is not helpful.  - Activity:          - Encouraged to remain active and participate in regular activities as symptoms allow.  Avoid exacerbating activities.   - Follow up:          -As needed in the future for re-evaluation and update to treatment plan, or sooner for new/worsening symptoms.  - Patient has clinic contact information for questions or concerns.       Nabil Tello DO, CAQSM  [facility] - Sports Medicine  [facility] - Department of Orthopedic Surgery       Disclaimer:  This note was prepared and written using Dragon Medical dictation software. As a result,  there may be errors in the script that have gone undetected. Please consider this when interpreting the information in this note.       Again, thank you for allowing me to participate in the care of your patient.        Sincerely,        Nabil Tello, DO

## 2023-10-14 RX ORDER — BUPIVACAINE HYDROCHLORIDE 5 MG/ML
0.5 INJECTION, SOLUTION PERINEURAL
Status: SHIPPED | OUTPATIENT
Start: 2023-10-13

## 2023-10-14 RX ORDER — TRIAMCINOLONE ACETONIDE 40 MG/ML
20 INJECTION, SUSPENSION INTRA-ARTICULAR; INTRAMUSCULAR
Status: SHIPPED | OUTPATIENT
Start: 2023-10-13

## 2023-10-22 ENCOUNTER — HEALTH MAINTENANCE LETTER (OUTPATIENT)
Age: 43
End: 2023-10-22

## 2023-12-08 ENCOUNTER — OFFICE VISIT (OUTPATIENT)
Dept: FAMILY MEDICINE | Facility: CLINIC | Age: 43
End: 2023-12-08
Payer: COMMERCIAL

## 2023-12-08 VITALS
RESPIRATION RATE: 16 BRPM | HEART RATE: 83 BPM | OXYGEN SATURATION: 99 % | SYSTOLIC BLOOD PRESSURE: 99 MMHG | BODY MASS INDEX: 34.2 KG/M2 | WEIGHT: 193 LBS | HEIGHT: 63 IN | TEMPERATURE: 98.3 F | DIASTOLIC BLOOD PRESSURE: 67 MMHG

## 2023-12-08 DIAGNOSIS — E11.9 TYPE 2 DIABETES MELLITUS WITHOUT COMPLICATION, WITHOUT LONG-TERM CURRENT USE OF INSULIN (H): ICD-10-CM

## 2023-12-08 DIAGNOSIS — Z00.00 ROUTINE GENERAL MEDICAL EXAMINATION AT A HEALTH CARE FACILITY: Primary | ICD-10-CM

## 2023-12-08 DIAGNOSIS — Z12.31 ENCOUNTER FOR SCREENING MAMMOGRAM FOR BREAST CANCER: ICD-10-CM

## 2023-12-08 DIAGNOSIS — E11.9 ENCOUNTER FOR DIABETIC FOOT EXAM (H): ICD-10-CM

## 2023-12-08 LAB
BASOPHILS # BLD AUTO: 0 10E3/UL (ref 0–0.2)
BASOPHILS NFR BLD AUTO: 1 %
EOSINOPHIL # BLD AUTO: 0.1 10E3/UL (ref 0–0.7)
EOSINOPHIL NFR BLD AUTO: 2 %
ERYTHROCYTE [DISTWIDTH] IN BLOOD BY AUTOMATED COUNT: 15.3 % (ref 10–15)
HBA1C MFR BLD: 6.1 % (ref 0–5.6)
HCT VFR BLD AUTO: 38 % (ref 35–47)
HGB BLD-MCNC: 12.1 G/DL (ref 11.7–15.7)
IMM GRANULOCYTES # BLD: 0 10E3/UL
IMM GRANULOCYTES NFR BLD: 0 %
LYMPHOCYTES # BLD AUTO: 1.5 10E3/UL (ref 0.8–5.3)
LYMPHOCYTES NFR BLD AUTO: 33 %
MCH RBC QN AUTO: 22.3 PG (ref 26.5–33)
MCHC RBC AUTO-ENTMCNC: 31.8 G/DL (ref 31.5–36.5)
MCV RBC AUTO: 70 FL (ref 78–100)
MONOCYTES # BLD AUTO: 0.5 10E3/UL (ref 0–1.3)
MONOCYTES NFR BLD AUTO: 10 %
NEUTROPHILS # BLD AUTO: 2.5 10E3/UL (ref 1.6–8.3)
NEUTROPHILS NFR BLD AUTO: 55 %
PLATELET # BLD AUTO: 304 10E3/UL (ref 150–450)
RBC # BLD AUTO: 5.42 10E6/UL (ref 3.8–5.2)
WBC # BLD AUTO: 4.7 10E3/UL (ref 4–11)

## 2023-12-08 PROCEDURE — 99207 PR FOOT EXAM NO CHARGE: CPT | Mod: 25 | Performed by: PHYSICIAN ASSISTANT

## 2023-12-08 PROCEDURE — 90686 IIV4 VACC NO PRSV 0.5 ML IM: CPT | Performed by: PHYSICIAN ASSISTANT

## 2023-12-08 PROCEDURE — 36415 COLL VENOUS BLD VENIPUNCTURE: CPT | Performed by: PHYSICIAN ASSISTANT

## 2023-12-08 PROCEDURE — 90471 IMMUNIZATION ADMIN: CPT | Performed by: PHYSICIAN ASSISTANT

## 2023-12-08 PROCEDURE — 85025 COMPLETE CBC W/AUTO DIFF WBC: CPT | Performed by: PHYSICIAN ASSISTANT

## 2023-12-08 PROCEDURE — 90472 IMMUNIZATION ADMIN EACH ADD: CPT | Performed by: PHYSICIAN ASSISTANT

## 2023-12-08 PROCEDURE — 99396 PREV VISIT EST AGE 40-64: CPT | Mod: 25 | Performed by: PHYSICIAN ASSISTANT

## 2023-12-08 PROCEDURE — 90480 ADMN SARSCOV2 VAC 1/ONLY CMP: CPT | Performed by: PHYSICIAN ASSISTANT

## 2023-12-08 PROCEDURE — 91320 SARSCV2 VAC 30MCG TRS-SUC IM: CPT | Performed by: PHYSICIAN ASSISTANT

## 2023-12-08 PROCEDURE — 83036 HEMOGLOBIN GLYCOSYLATED A1C: CPT | Performed by: PHYSICIAN ASSISTANT

## 2023-12-08 PROCEDURE — 90746 HEPB VACCINE 3 DOSE ADULT IM: CPT | Performed by: PHYSICIAN ASSISTANT

## 2023-12-08 PROCEDURE — 99213 OFFICE O/P EST LOW 20 MIN: CPT | Mod: 25 | Performed by: PHYSICIAN ASSISTANT

## 2023-12-08 ASSESSMENT — ENCOUNTER SYMPTOMS
FEVER: 0
EYE PAIN: 0
ARTHRALGIAS: 0
CHILLS: 0
PALPITATIONS: 0
BREAST MASS: 0
HEMATURIA: 0
JOINT SWELLING: 0
HEARTBURN: 0
WEAKNESS: 0
DIARRHEA: 0
NAUSEA: 0
HEMATOCHEZIA: 0
PARESTHESIAS: 0
COUGH: 0
DYSURIA: 0
MYALGIAS: 0
CONSTIPATION: 1
FREQUENCY: 0
SORE THROAT: 0
DIZZINESS: 0
SHORTNESS OF BREATH: 0
ABDOMINAL PAIN: 0
HEADACHES: 0
NERVOUS/ANXIOUS: 0

## 2023-12-08 ASSESSMENT — PAIN SCALES - GENERAL: PAINLEVEL: NO PAIN (0)

## 2023-12-08 NOTE — PROGRESS NOTES
SUBJECTIVE:   Steve is a 42 year old, presenting for the following:  Physical      Healthy Habits:     Getting at least 3 servings of Calcium per day:  Yes    Bi-annual eye exam:  Yes    Dental care twice a year:  Yes    Sleep apnea or symptoms of sleep apnea:  None    Diet:  Diabetic    Frequency of exercise:  2-3 days/week    Duration of exercise:  30-45 minutes    Taking medications regularly:  Yes    Medication side effects:  None    Additional concerns today:  No    Trigger finger improved.  Following injection.  Has not noted to have any elevation of her glucose following that.    Has been checking sugars with CGM follow-up follows with endocrinology for type 2 diabetes..  Morning sugars have been occasional less then 100. At times at 4 am has had lows  in the 70 range.  Patient has upcoming appointment with endocrinology in January.  Was prescribed Ozempic prior, however, not covered so started on Bydureon.    Has been previously diagnosed with gestational diabetes, however, this has remained persistent with type 2 diabete states she had an eye exam last month at [facility] with no concerns for diabetic retinopathy.  Patient will have records faxed.      No family history of breast or colon cacner.     Works as CNA     Last PAP 6/29/2022 negative repeat in 5 years.     Have you ever done Advance Care Planning? (For example, a Health Directive, POLST, or a discussion with a medical provider or your loved ones about your wishes): No, advance care planning information given to patient to review.  Patient plans to discuss their wishes with loved ones or provider.      Social History     Tobacco Use    Smoking status: Never    Smokeless tobacco: Never   Substance Use Topics    Alcohol use: Not Currently           12/8/2023    10:01 AM   Alcohol Use   Prescreen: >3 drinks/day or >7 drinks/week? No     Reviewed orders with patient.  Reviewed health maintenance and updated orders accordingly -      Breast Cancer Screenin/8/2023    10:02 AM   Breast CA Risk Assessment (FHS-7)   Do you have a family history of breast, colon, or ovarian cancer? No / Unknown         Mammogram Screening - Offered annual screening and updated Health Maintenance for [place] plan based on risk factor consideration  Pertinent mammograms are reviewed under the imaging tab.    History of abnormal Pap smear: NO - age 30-65 PAP every 5 years with negative HPV co-testing recommended      Latest Ref Rng & Units 2022     9:36 AM   PAP / HPV   PAP  Negative for Intraepithelial Lesion or Malignancy (NILM)    HPV 16 DNA Negative Negative    HPV 18 DNA Negative Negative    Other HR HPV Negative Negative      Reviewed and updated as needed this visit by clinical staff   Tobacco  Allergies  Meds              Reviewed and updated as needed this visit by Provider                 Past Medical History:   Diagnosis Date    No pertinent past medical history       Past Surgical History:   Procedure Laterality Date    NO HISTORY OF SURGERY         Review of Systems   Constitutional:  Negative for chills and fever.   HENT:  Negative for congestion, ear pain, hearing loss and sore throat.    Eyes:  Negative for pain and visual disturbance.   Respiratory:  Negative for cough and shortness of breath.    Cardiovascular:  Negative for chest pain, palpitations and peripheral edema.   Gastrointestinal:  Positive for constipation. Negative for abdominal pain, diarrhea, heartburn, hematochezia and nausea.   Breasts:  Negative for tenderness, breast mass and discharge.   Genitourinary:  Negative for dysuria, frequency, genital sores, hematuria, pelvic pain, urgency, vaginal bleeding and vaginal discharge.   Musculoskeletal:  Negative for arthralgias, joint swelling and myalgias.   Skin:  Negative for rash.   Neurological:  Negative for dizziness, weakness, headaches and paresthesias.   Psychiatric/Behavioral:  Negative for mood changes. The  "patient is not nervous/anxious.       Since pregnancy having firm stools every other day. Has been using mirilax.   OBJECTIVE:   BP 99/67   Pulse 83   Temp 98.3  F (36.8  C) (Tympanic)   Resp 16   Ht 1.6 m (5' 3\")   Wt 87.5 kg (193 lb)   SpO2 99%   BMI 34.19 kg/m    Physical Exam  GENERAL: alert, no distress, and over weight  EYES: Eyes grossly normal to inspection, PERRL and conjunctivae and sclerae normal  HENT: ear canals and TM's normal, nose and mouth without ulcers or lesions  NECK: no adenopathy, no asymmetry, masses, or scars and thyroid normal to palpation  RESP: lungs clear to auscultation - no rales, rhonchi or wheezes  CV: regular rate and rhythm, normal S1 S2, no S3 or S4, no murmur, click or rub, no peripheral edema and peripheral pulses strong  ABDOMEN: soft, nontender, no hepatosplenomegaly, no masses and bowel sounds normal  MS: no gross musculoskeletal defects noted, no edema  SKIN: no suspicious lesions or rashes  NEURO: Normal strength and tone, mentation intact and speech normal  PSYCH: mentation appears normal, affect normal/bright  Foot: Normal monofilament examination.  Intact DP and PT pulses.  No ulcerations or skin breakdown.        ASSESSMENT/PLAN:   (Z00.00) Routine general medical examination at a health care facility  (primary encounter diagnosis)  Comment:   Plan: CBC with platelets and differential            (Z12.31) Encounter for screening mammogram for breast cancer  Comment: Discussed screening mammogram.  Plan: *MA Screening Digital Bilateral          (E11.9) Type 2 diabetes mellitus without complication, without long-term current use of insulin (H)  Comment: History of type 2 diabetes.  Currently following with endocrinology for titration of medications.  Discussed with patient recheck of hemoglobin A1c here today.  Patient denies any neuropathy issues.  Recently had eye exam.   Plan: HEMOGLOBIN A1C          (E11.9) Encounter for diabetic foot exam (H)  Comment: Diabetic " "foot examination.  Normal monofilament examination.  Normal sensation to light touch.  Plan: FOOT EXAM          Patient has been advised of split billing requirements and indicates understanding: Yes      COUNSELING:  Reviewed preventive health counseling, as reflected in patient instructions       Regular exercise       Healthy diet/nutrition       Vision screening       Alcohol Use      BMI:   Estimated body mass index is 34.19 kg/m  as calculated from the following:    Height as of this encounter: 1.6 m (5' 3\").    Weight as of this encounter: 87.5 kg (193 lb).   Weight management plan: Discussed healthy diet and exercise guidelines      She reports that she has never smoked. She has never used smokeless tobacco.          Farhat Sheppard PA-C  [facility]  "

## 2024-03-10 ENCOUNTER — HEALTH MAINTENANCE LETTER (OUTPATIENT)
Age: 44
End: 2024-03-10

## 2024-07-28 ENCOUNTER — HEALTH MAINTENANCE LETTER (OUTPATIENT)
Age: 44
End: 2024-07-28

## 2024-12-15 ENCOUNTER — HEALTH MAINTENANCE LETTER (OUTPATIENT)
Age: 44
End: 2024-12-15

## 2025-01-19 ENCOUNTER — HEALTH MAINTENANCE LETTER (OUTPATIENT)
Age: 45
End: 2025-01-19

## 2025-03-16 ENCOUNTER — HEALTH MAINTENANCE LETTER (OUTPATIENT)
Age: 45
End: 2025-03-16

## 2025-06-29 ENCOUNTER — HEALTH MAINTENANCE LETTER (OUTPATIENT)
Age: 45
End: 2025-06-29